# Patient Record
Sex: MALE | Race: WHITE | Employment: FULL TIME | URBAN - METROPOLITAN AREA
[De-identification: names, ages, dates, MRNs, and addresses within clinical notes are randomized per-mention and may not be internally consistent; named-entity substitution may affect disease eponyms.]

---

## 2017-01-17 ENCOUNTER — APPOINTMENT (OUTPATIENT)
Dept: GENERAL RADIOLOGY | Age: 27
End: 2017-01-17
Attending: PHYSICIAN ASSISTANT
Payer: COMMERCIAL

## 2017-01-17 ENCOUNTER — HOSPITAL ENCOUNTER (EMERGENCY)
Age: 27
Discharge: HOME | End: 2017-01-17
Attending: EMERGENCY MEDICINE
Payer: COMMERCIAL

## 2017-01-17 VITALS
RESPIRATION RATE: 17 BRPM | DIASTOLIC BLOOD PRESSURE: 60 MMHG | BODY MASS INDEX: 22.73 KG/M2 | TEMPERATURE: 99.6 F | SYSTOLIC BLOOD PRESSURE: 108 MMHG | HEIGHT: 68 IN | OXYGEN SATURATION: 99 % | WEIGHT: 150 LBS | HEART RATE: 73 BPM

## 2017-01-17 DIAGNOSIS — K52.9 GASTROENTERITIS: Primary | ICD-10-CM

## 2017-01-17 LAB
ALBUMIN SERPL BCP-MCNC: 4.3 G/DL (ref 3.5–5)
ALBUMIN/GLOB SERPL: 1.2 {RATIO} (ref 1.1–2.2)
ALP SERPL-CCNC: 75 U/L (ref 45–117)
ALT SERPL-CCNC: 27 U/L (ref 12–78)
ANION GAP BLD CALC-SCNC: 12 MMOL/L (ref 5–15)
AST SERPL W P-5'-P-CCNC: 16 U/L (ref 15–37)
BASOPHILS # BLD AUTO: 0 K/UL (ref 0–0.1)
BASOPHILS # BLD: 0 % (ref 0–1)
BILIRUB SERPL-MCNC: 0.4 MG/DL (ref 0.2–1)
BUN SERPL-MCNC: 16 MG/DL (ref 6–20)
BUN/CREAT SERPL: 14 (ref 12–20)
CALCIUM SERPL-MCNC: 9.2 MG/DL (ref 8.5–10.1)
CHLORIDE SERPL-SCNC: 99 MMOL/L (ref 97–108)
CO2 SERPL-SCNC: 26 MMOL/L (ref 21–32)
CREAT SERPL-MCNC: 1.17 MG/DL (ref 0.7–1.3)
DIFFERENTIAL METHOD BLD: ABNORMAL
EOSINOPHIL # BLD: 0 K/UL (ref 0–0.4)
EOSINOPHIL NFR BLD: 0 % (ref 0–7)
ERYTHROCYTE [DISTWIDTH] IN BLOOD BY AUTOMATED COUNT: 12.4 % (ref 11.5–14.5)
GLOBULIN SER CALC-MCNC: 3.7 G/DL (ref 2–4)
GLUCOSE SERPL-MCNC: 164 MG/DL (ref 65–100)
HCT VFR BLD AUTO: 40.8 % (ref 36.6–50.3)
HGB BLD-MCNC: 14.1 G/DL (ref 12.1–17)
LIPASE SERPL-CCNC: 84 U/L (ref 73–393)
LYMPHOCYTES # BLD AUTO: 5 % (ref 12–49)
LYMPHOCYTES # BLD: 0.7 K/UL (ref 0.8–3.5)
MCH RBC QN AUTO: 30.6 PG (ref 26–34)
MCHC RBC AUTO-ENTMCNC: 34.6 G/DL (ref 30–36.5)
MCV RBC AUTO: 88.5 FL (ref 80–99)
MONOCYTES # BLD: 0.4 K/UL (ref 0–1)
MONOCYTES NFR BLD AUTO: 3 % (ref 5–13)
NEUTS SEG # BLD: 12.3 K/UL (ref 1.8–8)
NEUTS SEG NFR BLD AUTO: 92 % (ref 32–75)
PLATELET # BLD AUTO: 321 K/UL (ref 150–400)
POTASSIUM SERPL-SCNC: 3.9 MMOL/L (ref 3.5–5.1)
PROT SERPL-MCNC: 8 G/DL (ref 6.4–8.2)
RBC # BLD AUTO: 4.61 M/UL (ref 4.1–5.7)
RBC MORPH BLD: ABNORMAL
SODIUM SERPL-SCNC: 137 MMOL/L (ref 136–145)
WBC # BLD AUTO: 13.4 K/UL (ref 4.1–11.1)

## 2017-01-17 PROCEDURE — 74011250636 HC RX REV CODE- 250/636: Performed by: PHYSICIAN ASSISTANT

## 2017-01-17 PROCEDURE — 80053 COMPREHEN METABOLIC PANEL: CPT | Performed by: PHYSICIAN ASSISTANT

## 2017-01-17 PROCEDURE — 96374 THER/PROPH/DIAG INJ IV PUSH: CPT

## 2017-01-17 PROCEDURE — 74020 XR ABD FLAT/ ERECT: CPT

## 2017-01-17 PROCEDURE — 96375 TX/PRO/DX INJ NEW DRUG ADDON: CPT

## 2017-01-17 PROCEDURE — 96361 HYDRATE IV INFUSION ADD-ON: CPT

## 2017-01-17 PROCEDURE — 99283 EMERGENCY DEPT VISIT LOW MDM: CPT

## 2017-01-17 PROCEDURE — 36415 COLL VENOUS BLD VENIPUNCTURE: CPT | Performed by: PHYSICIAN ASSISTANT

## 2017-01-17 PROCEDURE — 96372 THER/PROPH/DIAG INJ SC/IM: CPT

## 2017-01-17 PROCEDURE — 74011000250 HC RX REV CODE- 250: Performed by: PHYSICIAN ASSISTANT

## 2017-01-17 PROCEDURE — 83690 ASSAY OF LIPASE: CPT | Performed by: PHYSICIAN ASSISTANT

## 2017-01-17 PROCEDURE — 85025 COMPLETE CBC W/AUTO DIFF WBC: CPT | Performed by: PHYSICIAN ASSISTANT

## 2017-01-17 RX ORDER — ONDANSETRON 2 MG/ML
4 INJECTION INTRAMUSCULAR; INTRAVENOUS
Status: COMPLETED | OUTPATIENT
Start: 2017-01-17 | End: 2017-01-17

## 2017-01-17 RX ORDER — FAMOTIDINE 10 MG/ML
20 INJECTION INTRAVENOUS
Status: COMPLETED | OUTPATIENT
Start: 2017-01-17 | End: 2017-01-17

## 2017-01-17 RX ORDER — PROCHLORPERAZINE EDISYLATE 5 MG/ML
10 INJECTION INTRAMUSCULAR; INTRAVENOUS
Status: COMPLETED | OUTPATIENT
Start: 2017-01-17 | End: 2017-01-17

## 2017-01-17 RX ORDER — PROMETHAZINE HYDROCHLORIDE 25 MG/1
25 SUPPOSITORY RECTAL
Qty: 12 SUPPOSITORY | Refills: 0 | Status: SHIPPED | OUTPATIENT
Start: 2017-01-17 | End: 2017-01-24

## 2017-01-17 RX ORDER — ONDANSETRON 4 MG/1
4 TABLET, ORALLY DISINTEGRATING ORAL
Qty: 20 TAB | Refills: 0 | Status: ON HOLD | OUTPATIENT
Start: 2017-01-17 | End: 2017-09-02

## 2017-01-17 RX ORDER — DICYCLOMINE HYDROCHLORIDE 10 MG/ML
20 INJECTION INTRAMUSCULAR
Status: COMPLETED | OUTPATIENT
Start: 2017-01-17 | End: 2017-01-17

## 2017-01-17 RX ORDER — DICYCLOMINE HYDROCHLORIDE 20 MG/1
20 TABLET ORAL EVERY 6 HOURS
Qty: 20 TAB | Refills: 0 | Status: SHIPPED | OUTPATIENT
Start: 2017-01-17 | End: 2017-01-22

## 2017-01-17 RX ADMIN — FAMOTIDINE 20 MG: 10 INJECTION, SOLUTION INTRAVENOUS at 01:38

## 2017-01-17 RX ADMIN — PROCHLORPERAZINE EDISYLATE 10 MG: 5 INJECTION INTRAMUSCULAR; INTRAVENOUS at 01:35

## 2017-01-17 RX ADMIN — ONDANSETRON 4 MG: 2 INJECTION INTRAMUSCULAR; INTRAVENOUS at 00:49

## 2017-01-17 RX ADMIN — SODIUM CHLORIDE 1000 ML: 900 INJECTION, SOLUTION INTRAVENOUS at 00:43

## 2017-01-17 RX ADMIN — DICYCLOMINE HYDROCHLORIDE 20 MG: 20 INJECTION, SOLUTION INTRAMUSCULAR at 00:49

## 2017-01-17 NOTE — LETTER
[address] 
[facility] EMERGENCY DEPT 
[address] [facility] [place] [id number] [phone] Work/School Note Date: 1/17/2017 To Whom It May concern: 
 
Jorge Crowe was seen and treated today in the emergency room by the following provider(s): 
Physician Assistant: CARRILLO Rasmussen. Jorge Crowe may return to work on 1/19/2017. Sincerely, Heber Smith DO

## 2017-01-17 NOTE — ED PROVIDER NOTES
HPI Comments: Patient presents with complaints of nausea, vomiting and diarrhea x 9 hours. Denies fever or chills. Denies any sick contacts. Patient is a 27 y.o. male presenting with vomiting. The history is provided by the patient. Vomiting    This is a new problem. The current episode started 6 to 12 hours ago. The problem has not changed since onset. The emesis has an appearance of stomach contents. There has been no fever. Associated symptoms include abdominal pain and diarrhea. Pertinent negatives include no chills, no fever, no sweats, no headaches, no arthralgias, no myalgias, no cough, no URI and no headaches. The patient is not pregnant. His pertinent negatives include no irritable bowel syndrome, no inflammatory bowel disease, no short gut syndrome, no bowel resection, no recent abdominal surgery, no malabsorption, no gastric bypass and no DM. Past Medical History:   Diagnosis Date    Gastric ulcer      due to drug use    Heart murmur of      Hx of drug abuse     Ulcer ([facility] [address]        Past Surgical History:   Procedure Laterality Date    Hx other surgical       infant heart murmur         Family History:   Problem Relation Age of Onset    Migraines Mother        Social History     Social History    Marital status:      Spouse name: N/A    Number of children: N/A    Years of education: N/A     Occupational History    Not on file. Social History Main Topics    Smoking status: Former Smoker    Smokeless tobacco: Never Used    Alcohol use No    Drug use: No    Sexual activity: Yes     Partners: Female     Other Topics Concern    Not on file     Social History Narrative    ** Merged History Encounter **              ALLERGIES: Review of patient's allergies indicates no known allergies. Review of Systems   Constitutional: Negative. Negative for chills and fever. HENT: Negative. Eyes: Negative. Respiratory: Negative. Negative for cough.     Cardiovascular: Negative. Gastrointestinal: Positive for abdominal pain, diarrhea and vomiting. Endocrine: Negative. Genitourinary: Negative. Musculoskeletal: Negative. Negative for arthralgias and myalgias. Skin: Negative. Allergic/Immunologic: Negative. Neurological: Negative. Negative for headaches. Hematological: Negative. Psychiatric/Behavioral: Negative. All other systems reviewed and are negative. Vitals:    01/17/17 0013   BP: 121/72   Pulse: 73   Resp: 17   Temp: 99.6 °F (37.6 °C)   SpO2: 96%   Weight: 68 kg (150 lb)   Height: 5' 8\" (1.727 m)            Physical Exam   Constitutional: He is oriented to person, place, and time. He appears well-developed and well-nourished. Non-toxic appearance. He does not have a sickly appearance. He appears ill. No distress. Patient vomiting during exam.   HENT:   Head: Normocephalic and atraumatic. Right Ear: External ear normal.   Left Ear: External ear normal.   Mouth/Throat: Oropharynx is clear and moist. No oropharyngeal exudate. Eyes: Conjunctivae and EOM are normal. Pupils are equal, round, and reactive to light. Right eye exhibits no discharge. Left eye exhibits no discharge. No scleral icterus. Neck: Normal range of motion. Neck supple. No tracheal deviation present. No thyromegaly present. Cardiovascular: Normal rate, regular rhythm, normal heart sounds and intact distal pulses. No murmur heard. Pulmonary/Chest: Effort normal and breath sounds normal. No respiratory distress. He has no wheezes. He has no rales. Abdominal: Soft. Normal appearance and bowel sounds are normal. He exhibits no distension. There is no tenderness. There is no rebound and no guarding. Musculoskeletal: Normal range of motion. He exhibits no edema or tenderness. Lymphadenopathy:     He has no cervical adenopathy. Neurological: He is alert and oriented to person, place, and time. No cranial nerve deficit. Coordination normal.   Skin: Skin is warm.  No rash noted. No erythema. Psychiatric: He has a normal mood and affect. His behavior is normal. Judgment and thought content normal.   Nursing note and vitals reviewed. MDM  Number of Diagnoses or Management Options  Gastroenteritis:   Diagnosis management comments: Assesment/Plan- mild leukocytosis in ED. Glucose elevated but no history of diabetes and no anion gap. Negative lipase. Symptoms likely related to gastroenteritis. Discharge with rx for symptomatic treatment. Patient encouraged to follow up with PCP and patient is educated on reasons to return to the ED.          Amount and/or Complexity of Data Reviewed  Clinical lab tests: ordered and reviewed  Tests in the radiology section of CPT®: ordered and reviewed  Tests in the medicine section of CPT®: reviewed and ordered  Discuss the patient with other providers: yes (Attending- Dr. Starr Peña)      ED Course       Procedures

## 2017-01-17 NOTE — DISCHARGE INSTRUCTIONS
Gastroenteritis: Care Instructions  Your Care Instructions  Gastroenteritis is an illness that may cause nausea, vomiting, and diarrhea. It is sometimes called \"stomach flu. \" It can be caused by bacteria or a virus. You will probably begin to feel better in 1 to 2 days. In the meantime, get plenty of rest and make sure you do not become dehydrated. Dehydration occurs when your body loses too much fluid. Follow-up care is a key part of your treatment and safety. Be sure to make and go to all appointments, and call your doctor if you are having problems. Its also a good idea to know your test results and keep a list of the medicines you take. How can you care for yourself at home? · If your doctor prescribed antibiotics, take them as directed. Do not stop taking them just because you feel better. You need to take the full course of antibiotics. · Drink plenty of fluids to prevent dehydration, enough so that your urine is light yellow or clear like water. Choose water and other caffeine-free clear liquids until you feel better. If you have kidney, heart, or liver disease and have to limit fluids, talk with your doctor before you increase your fluid intake. · Drink fluids slowly, in frequent, small amounts, because drinking too much too fast can cause vomiting. · Begin eating mild foods, such as dry toast, yogurt, applesauce, bananas, and rice. Avoid spicy, hot, or high-fat foods, and do not drink alcohol or caffeine for a day or two. Do not drink milk or eat ice cream until you are feeling better. How to prevent gastroenteritis  · Keep hot foods hot and cold foods cold. · Do not eat meats, dressings, salads, or other foods that have been kept at room temperature for more than 2 hours. · Use a thermometer to check your refrigerator. It should be between 34°F and 40°F.  · Defrost meats in the refrigerator or microwave, not on the kitchen counter. · Keep your hands and your kitchen clean.  Wash your hands, cutting boards, and countertops with hot soapy water frequently. · Cook meat until it is well done. · Do not eat raw eggs or uncooked sauces made with raw eggs. · Do not take chances. If food looks or tastes spoiled, throw it out. When should you call for help? Call 911 anytime you think you may need emergency care. For example, call if:  · You vomit blood or what looks like coffee grounds. · You passed out (lost consciousness). · You pass maroon or very bloody stools. Call your doctor now or seek immediate medical care if:  · You have severe belly pain. · You have signs of needing more fluids. You have sunken eyes, a dry mouth, and pass only a little dark urine. · You feel like you are going to faint. · You have increased belly pain that does not go away in 1 to 2 days. · You have new or increased nausea, or you are vomiting. · You have a new or higher fever. · Your stools are black and tarlike or have streaks of blood. Watch closely for changes in your health, and be sure to contact your doctor if:  · You are dizzy or lightheaded. · You urinate less than usual, or your urine is dark yellow or brown. · You do not feel better with each day that goes by. Where can you learn more? Go to http://isaac-zoraida.info/. Enter N142 in the search box to learn more about \"Gastroenteritis: Care Instructions. \"  Current as of: May 24, 2016  Content Version: 11.1  © 5819-8028 [employer]. Care instructions adapted under license by [employer] (which disclaims liability or warranty for this information). If you have questions about a medical condition or this instruction, always ask your healthcare professional. [address] any warranty or liability for your use of this information. We hope that we have addressed all of your medical concerns.  The examination and treatment you received in the Emergency Department were for an emergent problem and were not intended as complete care. It is important that you follow up with your healthcare provider(s) for ongoing care. If your symptoms worsen or do not improve as expected, and you are unable to reach your usual health care provider(s), you should return to the Emergency Department. Today's healthcare is undergoing tremendous change, and patient satisfaction surveys are one of the many tools to assess the quality of medical care. You may receive a survey from the [employer] regarding your experience in the Emergency Department. I hope that your experience has been completely positive, particularly the medical care that I provided. As such, please participate in the survey; anything less than excellent does not meet my expectations or intentions. [address] and [facility] participate in nationally recognized quality of care measures. If your blood pressure is greater than 120/80, as reported below, we urge that you seek medical care to address the potential of high blood pressure, commonly known as hypertension. Hypertension can be hereditary or can be caused by certain medical conditions, pain, stress, or \"white coat syndrome. \"       Please make an appointment with your health care provider(s) for follow up of your Emergency Department visit. VITALS:   Patient Vitals for the past 8 hrs:   Temp Pulse Resp BP SpO2   01/17/17 0013 99.6 °F (37.6 °C) 73 17 121/72 96 %          Thank you for allowing us to provide you with medical care today. We realize that you have many choices for your emergency care needs. Please choose us in the future for any continued health care needs. Kassi Salazar[address].   Office: [phone]            Recent Results (from the past 24 hour(s))   CBC WITH AUTOMATED DIFF    Collection Time: 01/17/17 12:43 AM   Result Value Ref Range    WBC 13.4 (H) 4.1 - 11.1 K/uL    RBC 4.61 4.10 - 5.70 M/uL    HGB 14.1 12.1 - 17.0 g/dL    HCT 40.8 36.6 - 50.3 %    MCV 88.5 80.0 - 99.0 FL    MCH 30.6 26.0 - 34.0 PG    MCHC 34.6 30.0 - 36.5 g/dL    RDW 12.4 11.5 - 14.5 %    PLATELET 692 502 - 913 K/uL    NEUTROPHILS 92 (H) 32 - 75 %    LYMPHOCYTES 5 (L) 12 - 49 %    MONOCYTES 3 (L) 5 - 13 %    EOSINOPHILS 0 0 - 7 %    BASOPHILS 0 0 - 1 %    ABS. NEUTROPHILS 12.3 (H) 1.8 - 8.0 K/UL    ABS. LYMPHOCYTES 0.7 (L) 0.8 - 3.5 K/UL    ABS. MONOCYTES 0.4 0.0 - 1.0 K/UL    ABS. EOSINOPHILS 0.0 0.0 - 0.4 K/UL    ABS. BASOPHILS 0.0 0.0 - 0.1 K/UL    DF SMEAR SCANNED      RBC COMMENTS NORMOCYTIC, NORMOCHROMIC     METABOLIC PANEL, COMPREHENSIVE    Collection Time: 01/17/17 12:43 AM   Result Value Ref Range    Sodium 137 136 - 145 mmol/L    Potassium 3.9 3.5 - 5.1 mmol/L    Chloride 99 97 - 108 mmol/L    CO2 26 21 - 32 mmol/L    Anion gap 12 5 - 15 mmol/L    Glucose 164 (H) 65 - 100 mg/dL    BUN 16 6 - 20 MG/DL    Creatinine 1.17 0.70 - 1.30 MG/DL    BUN/Creatinine ratio 14 12 - 20      GFR est AA >60 >60 ml/min/1.73m2    GFR est non-AA >60 >60 ml/min/1.73m2    Calcium 9.2 8.5 - 10.1 MG/DL    Bilirubin, total 0.4 0.2 - 1.0 MG/DL    ALT 27 12 - 78 U/L    AST 16 15 - 37 U/L    Alk. phosphatase 75 45 - 117 U/L    Protein, total 8.0 6.4 - 8.2 g/dL    Albumin 4.3 3.5 - 5.0 g/dL    Globulin 3.7 2.0 - 4.0 g/dL    A-G Ratio 1.2 1.1 - 2.2     LIPASE    Collection Time: 01/17/17 12:43 AM   Result Value Ref Range    Lipase 84 73 - 393 U/L       Xr Abd Flat/ Erect    Result Date: 1/17/2017  INDICATION:   abdominal pain COMPARISON: December 17, 2008 FINDINGS: Supine and upright views of the abdomen demonstrate a nonobstructive bowel gas pattern. There is no free air. No abnormal calcifications are identified. The osseous structures are normal.     IMPRESSION: No acute process.

## 2017-01-18 ENCOUNTER — APPOINTMENT (OUTPATIENT)
Dept: CT IMAGING | Age: 27
End: 2017-01-18
Attending: EMERGENCY MEDICINE
Payer: COMMERCIAL

## 2017-01-18 ENCOUNTER — HOSPITAL ENCOUNTER (EMERGENCY)
Age: 27
Discharge: HOME | End: 2017-01-18
Attending: EMERGENCY MEDICINE | Admitting: FAMILY MEDICINE
Payer: COMMERCIAL

## 2017-01-18 VITALS
TEMPERATURE: 97.9 F | OXYGEN SATURATION: 98 % | DIASTOLIC BLOOD PRESSURE: 74 MMHG | SYSTOLIC BLOOD PRESSURE: 115 MMHG | RESPIRATION RATE: 18 BRPM | HEART RATE: 74 BPM

## 2017-01-18 DIAGNOSIS — R11.2 INTRACTABLE VOMITING WITH NAUSEA, UNSPECIFIED VOMITING TYPE: ICD-10-CM

## 2017-01-18 DIAGNOSIS — K52.9 COLITIS: Primary | ICD-10-CM

## 2017-01-18 LAB
ALBUMIN SERPL BCP-MCNC: 4.2 G/DL (ref 3.5–5)
ALBUMIN SERPL BCP-MCNC: 4.2 G/DL (ref 3.5–5)
ALBUMIN SERPL BCP-MCNC: ABNORMAL G/DL
ALBUMIN/GLOB SERPL: 1.2 {RATIO} (ref 1.1–2.2)
ALBUMIN/GLOB SERPL: 1.2 {RATIO} (ref 1.1–2.2)
ALBUMIN/GLOB SERPL: ABNORMAL {RATIO}
ALP SERPL-CCNC: 77 U/L (ref 45–117)
ALP SERPL-CCNC: 77 U/L (ref 45–117)
ALP SERPL-CCNC: ABNORMAL U/L
ALT SERPL-CCNC: 25 U/L (ref 12–78)
ALT SERPL-CCNC: 25 U/L (ref 12–78)
ALT SERPL-CCNC: ABNORMAL U/L
AMPHET UR QL SCN: NEGATIVE
ANION GAP BLD CALC-SCNC: 12 MMOL/L (ref 5–15)
APPEARANCE UR: CLEAR
AST SERPL W P-5'-P-CCNC: 25 U/L (ref 15–37)
AST SERPL W P-5'-P-CCNC: 25 U/L (ref 15–37)
AST SERPL W P-5'-P-CCNC: ABNORMAL U/L
BACTERIA URNS QL MICRO: NEGATIVE /HPF
BARBITURATES UR QL SCN: NEGATIVE
BASOPHILS # BLD AUTO: 0 K/UL (ref 0–0.1)
BASOPHILS # BLD AUTO: 0 K/UL (ref 0–0.1)
BASOPHILS # BLD: 0 % (ref 0–1)
BASOPHILS # BLD: 0 % (ref 0–1)
BENZODIAZ UR QL: NEGATIVE
BILIRUB SERPL-MCNC: 0.5 MG/DL (ref 0.2–1)
BILIRUB SERPL-MCNC: 0.5 MG/DL (ref 0.2–1)
BILIRUB SERPL-MCNC: ABNORMAL MG/DL
BILIRUB UR QL: NEGATIVE
BUN SERPL-MCNC: 20 MG/DL (ref 6–20)
BUN/CREAT SERPL: 17 (ref 12–20)
CALCIUM SERPL-MCNC: 9.6 MG/DL (ref 8.5–10.1)
CANNABINOIDS UR QL SCN: POSITIVE
CHLORIDE SERPL-SCNC: 101 MMOL/L (ref 97–108)
CO2 SERPL-SCNC: 28 MMOL/L (ref 21–32)
COCAINE UR QL SCN: NEGATIVE
COLOR UR: ABNORMAL
CREAT SERPL-MCNC: 1.16 MG/DL (ref 0.7–1.3)
DRUG SCRN COMMENT,DRGCM: ABNORMAL
EOSINOPHIL # BLD: 0 K/UL (ref 0–0.4)
EOSINOPHIL # BLD: 0 K/UL (ref 0–0.4)
EOSINOPHIL NFR BLD: 0 % (ref 0–7)
EOSINOPHIL NFR BLD: 0 % (ref 0–7)
EPITH CASTS URNS QL MICRO: ABNORMAL /LPF
ERYTHROCYTE [DISTWIDTH] IN BLOOD BY AUTOMATED COUNT: 12.4 % (ref 11.5–14.5)
ERYTHROCYTE [DISTWIDTH] IN BLOOD BY AUTOMATED COUNT: 12.4 % (ref 11.5–14.5)
GLOBULIN SER CALC-MCNC: 3.6 G/DL (ref 2–4)
GLOBULIN SER CALC-MCNC: 3.6 G/DL (ref 2–4)
GLOBULIN SER CALC-MCNC: ABNORMAL G/DL
GLUCOSE SERPL-MCNC: 126 MG/DL (ref 65–100)
GLUCOSE UR STRIP.AUTO-MCNC: NEGATIVE MG/DL
HCT VFR BLD AUTO: 42.1 % (ref 36.6–50.3)
HCT VFR BLD AUTO: 42.1 % (ref 36.6–50.3)
HGB BLD-MCNC: 15 G/DL (ref 12.1–17)
HGB BLD-MCNC: 15 G/DL (ref 12.1–17)
HGB UR QL STRIP: NEGATIVE
HYALINE CASTS URNS QL MICRO: ABNORMAL /LPF (ref 0–5)
KETONES UR QL STRIP.AUTO: 40 MG/DL
LEUKOCYTE ESTERASE UR QL STRIP.AUTO: NEGATIVE
LIPASE SERPL-CCNC: 110 U/L (ref 73–393)
LIPASE SERPL-CCNC: 110 U/L (ref 73–393)
LYMPHOCYTES # BLD AUTO: 10 % (ref 12–49)
LYMPHOCYTES # BLD AUTO: 10 % (ref 12–49)
LYMPHOCYTES # BLD: 1.1 K/UL (ref 0.8–3.5)
LYMPHOCYTES # BLD: 1.1 K/UL (ref 0.8–3.5)
MAGNESIUM SERPL-MCNC: 1.7 MG/DL (ref 1.6–2.4)
MCH RBC QN AUTO: 31.1 PG (ref 26–34)
MCH RBC QN AUTO: 31.1 PG (ref 26–34)
MCHC RBC AUTO-ENTMCNC: 35.6 G/DL (ref 30–36.5)
MCHC RBC AUTO-ENTMCNC: 35.6 G/DL (ref 30–36.5)
MCV RBC AUTO: 87.3 FL (ref 80–99)
MCV RBC AUTO: 87.3 FL (ref 80–99)
METHADONE UR QL: NEGATIVE
MONOCYTES # BLD: 0.7 K/UL (ref 0–1)
MONOCYTES # BLD: 0.7 K/UL (ref 0–1)
MONOCYTES NFR BLD AUTO: 6 % (ref 5–13)
MONOCYTES NFR BLD AUTO: 6 % (ref 5–13)
NEUTS SEG # BLD: 9.3 K/UL (ref 1.8–8)
NEUTS SEG # BLD: 9.3 K/UL (ref 1.8–8)
NEUTS SEG NFR BLD AUTO: 84 % (ref 32–75)
NEUTS SEG NFR BLD AUTO: 84 % (ref 32–75)
NITRITE UR QL STRIP.AUTO: NEGATIVE
OPIATES UR QL: POSITIVE
PCP UR QL: NEGATIVE
PH UR STRIP: 6 [PH] (ref 5–8)
PLATELET # BLD AUTO: 356 K/UL (ref 150–400)
PLATELET # BLD AUTO: 356 K/UL (ref 150–400)
POTASSIUM SERPL-SCNC: 3.4 MMOL/L (ref 3.5–5.1)
PROT SERPL-MCNC: 7.8 G/DL (ref 6.4–8.2)
PROT SERPL-MCNC: 7.8 G/DL (ref 6.4–8.2)
PROT SERPL-MCNC: ABNORMAL G/DL
PROT UR STRIP-MCNC: NEGATIVE MG/DL
RBC # BLD AUTO: 4.82 M/UL (ref 4.1–5.7)
RBC # BLD AUTO: 4.82 M/UL (ref 4.1–5.7)
RBC #/AREA URNS HPF: ABNORMAL /HPF (ref 0–5)
SODIUM SERPL-SCNC: 141 MMOL/L (ref 136–145)
SP GR UR REFRACTOMETRY: 1.01 (ref 1–1.03)
UA: UC IF INDICATED,UAUC: ABNORMAL
UROBILINOGEN UR QL STRIP.AUTO: 0.2 EU/DL (ref 0.2–1)
WBC # BLD AUTO: 11.1 K/UL (ref 4.1–11.1)
WBC # BLD AUTO: 11.1 K/UL (ref 4.1–11.1)
WBC URNS QL MICRO: ABNORMAL /HPF (ref 0–4)

## 2017-01-18 PROCEDURE — 36415 COLL VENOUS BLD VENIPUNCTURE: CPT

## 2017-01-18 PROCEDURE — 74011000250 HC RX REV CODE- 250: Performed by: EMERGENCY MEDICINE

## 2017-01-18 PROCEDURE — 74011000258 HC RX REV CODE- 258: Performed by: EMERGENCY MEDICINE

## 2017-01-18 PROCEDURE — 74011250636 HC RX REV CODE- 250/636: Performed by: FAMILY MEDICINE

## 2017-01-18 PROCEDURE — 74011636320 HC RX REV CODE- 636/320: Performed by: RADIOLOGY

## 2017-01-18 PROCEDURE — 96375 TX/PRO/DX INJ NEW DRUG ADDON: CPT

## 2017-01-18 PROCEDURE — 74011250636 HC RX REV CODE- 250/636: Performed by: EMERGENCY MEDICINE

## 2017-01-18 PROCEDURE — 85025 COMPLETE CBC W/AUTO DIFF WBC: CPT

## 2017-01-18 PROCEDURE — 96367 TX/PROPH/DG ADDL SEQ IV INF: CPT

## 2017-01-18 PROCEDURE — 81001 URINALYSIS AUTO W/SCOPE: CPT | Performed by: EMERGENCY MEDICINE

## 2017-01-18 PROCEDURE — C9113 INJ PANTOPRAZOLE SODIUM, VIA: HCPCS | Performed by: FAMILY MEDICINE

## 2017-01-18 PROCEDURE — 96365 THER/PROPH/DIAG IV INF INIT: CPT

## 2017-01-18 PROCEDURE — 80307 DRUG TEST PRSMV CHEM ANLYZR: CPT | Performed by: FAMILY MEDICINE

## 2017-01-18 PROCEDURE — 74011000250 HC RX REV CODE- 250: Performed by: FAMILY MEDICINE

## 2017-01-18 PROCEDURE — 74177 CT ABD & PELVIS W/CONTRAST: CPT

## 2017-01-18 PROCEDURE — 83690 ASSAY OF LIPASE: CPT

## 2017-01-18 PROCEDURE — 99285 EMERGENCY DEPT VISIT HI MDM: CPT

## 2017-01-18 PROCEDURE — 80053 COMPREHEN METABOLIC PANEL: CPT

## 2017-01-18 PROCEDURE — 96368 THER/DIAG CONCURRENT INF: CPT

## 2017-01-18 PROCEDURE — 74011250636 HC RX REV CODE- 250/636

## 2017-01-18 PROCEDURE — 96361 HYDRATE IV INFUSION ADD-ON: CPT

## 2017-01-18 PROCEDURE — 83735 ASSAY OF MAGNESIUM: CPT | Performed by: FAMILY MEDICINE

## 2017-01-18 RX ORDER — PROMETHAZINE HYDROCHLORIDE 25 MG/1
25 TABLET ORAL
COMMUNITY
End: 2017-09-01

## 2017-01-18 RX ORDER — CIPROFLOXACIN 2 MG/ML
400 INJECTION, SOLUTION INTRAVENOUS EVERY 12 HOURS
Status: DISCONTINUED | OUTPATIENT
Start: 2017-01-18 | End: 2017-01-18 | Stop reason: CLARIF

## 2017-01-18 RX ORDER — ACETAMINOPHEN 325 MG/1
650 TABLET ORAL
Status: DISCONTINUED | OUTPATIENT
Start: 2017-01-18 | End: 2017-01-18 | Stop reason: HOSPADM

## 2017-01-18 RX ORDER — PROCHLORPERAZINE EDISYLATE 5 MG/ML
INJECTION INTRAMUSCULAR; INTRAVENOUS
Status: DISPENSED
Start: 2017-01-18 | End: 2017-01-18

## 2017-01-18 RX ORDER — HYDROMORPHONE HYDROCHLORIDE 1 MG/ML
1 INJECTION, SOLUTION INTRAMUSCULAR; INTRAVENOUS; SUBCUTANEOUS
Status: COMPLETED | OUTPATIENT
Start: 2017-01-18 | End: 2017-01-18

## 2017-01-18 RX ORDER — MORPHINE SULFATE 4 MG/ML
INJECTION, SOLUTION INTRAMUSCULAR; INTRAVENOUS
Status: DISPENSED
Start: 2017-01-18 | End: 2017-01-18

## 2017-01-18 RX ORDER — ONDANSETRON 2 MG/ML
INJECTION INTRAMUSCULAR; INTRAVENOUS
Status: DISPENSED
Start: 2017-01-18 | End: 2017-01-18

## 2017-01-18 RX ORDER — METRONIDAZOLE 500 MG/100ML
500 INJECTION, SOLUTION INTRAVENOUS EVERY 8 HOURS
Status: DISCONTINUED | OUTPATIENT
Start: 2017-01-18 | End: 2017-01-18 | Stop reason: HOSPADM

## 2017-01-18 RX ORDER — FAMOTIDINE 10 MG/ML
INJECTION INTRAVENOUS
Status: DISPENSED
Start: 2017-01-18 | End: 2017-01-18

## 2017-01-18 RX ORDER — SODIUM CHLORIDE 9 MG/ML
100 INJECTION, SOLUTION INTRAVENOUS CONTINUOUS
Status: DISCONTINUED | OUTPATIENT
Start: 2017-01-18 | End: 2017-01-18 | Stop reason: HOSPADM

## 2017-01-18 RX ORDER — POTASSIUM CHLORIDE 7.45 MG/ML
10 INJECTION INTRAVENOUS
Status: DISCONTINUED | OUTPATIENT
Start: 2017-01-18 | End: 2017-01-18

## 2017-01-18 RX ORDER — MAG HYDROX/ALUMINUM HYD/SIMETH 200-200-20
SUSPENSION, ORAL (FINAL DOSE FORM) ORAL
Status: DISPENSED
Start: 2017-01-18 | End: 2017-01-18

## 2017-01-18 RX ORDER — SODIUM CHLORIDE 0.9 % (FLUSH) 0.9 %
5-10 SYRINGE (ML) INJECTION EVERY 8 HOURS
Status: DISCONTINUED | OUTPATIENT
Start: 2017-01-18 | End: 2017-01-18 | Stop reason: HOSPADM

## 2017-01-18 RX ORDER — LEVOFLOXACIN 5 MG/ML
750 INJECTION, SOLUTION INTRAVENOUS EVERY 24 HOURS
Status: DISCONTINUED | OUTPATIENT
Start: 2017-01-18 | End: 2017-01-18 | Stop reason: HOSPADM

## 2017-01-18 RX ORDER — SODIUM CHLORIDE 0.9 % (FLUSH) 0.9 %
5-10 SYRINGE (ML) INJECTION AS NEEDED
Status: DISCONTINUED | OUTPATIENT
Start: 2017-01-18 | End: 2017-01-18 | Stop reason: HOSPADM

## 2017-01-18 RX ORDER — METRONIDAZOLE 500 MG/100ML
500 INJECTION, SOLUTION INTRAVENOUS
Status: COMPLETED | OUTPATIENT
Start: 2017-01-18 | End: 2017-01-18

## 2017-01-18 RX ORDER — PROCHLORPERAZINE EDISYLATE 5 MG/ML
10 INJECTION INTRAMUSCULAR; INTRAVENOUS
Status: DISCONTINUED | OUTPATIENT
Start: 2017-01-18 | End: 2017-01-18 | Stop reason: HOSPADM

## 2017-01-18 RX ADMIN — METRONIDAZOLE 500 MG: 500 INJECTION, SOLUTION INTRAVENOUS at 15:51

## 2017-01-18 RX ADMIN — METRONIDAZOLE 500 MG: 500 INJECTION, SOLUTION INTRAVENOUS at 05:51

## 2017-01-18 RX ADMIN — Medication 10 ML: at 15:53

## 2017-01-18 RX ADMIN — HYDROMORPHONE HYDROCHLORIDE 1 MG: 1 INJECTION, SOLUTION INTRAMUSCULAR; INTRAVENOUS; SUBCUTANEOUS at 05:17

## 2017-01-18 RX ADMIN — Medication 10 ML: at 09:14

## 2017-01-18 RX ADMIN — SODIUM CHLORIDE 40 MG: 9 INJECTION INTRAMUSCULAR; INTRAVENOUS; SUBCUTANEOUS at 09:08

## 2017-01-18 RX ADMIN — LEVOFLOXACIN 750 MG: 5 INJECTION, SOLUTION INTRAVENOUS at 09:11

## 2017-01-18 RX ADMIN — CEFTRIAXONE 1 G: 1 INJECTION, POWDER, FOR SOLUTION INTRAMUSCULAR; INTRAVENOUS at 05:16

## 2017-01-18 RX ADMIN — IOPAMIDOL 94 ML: 755 INJECTION, SOLUTION INTRAVENOUS at 04:30

## 2017-01-18 RX ADMIN — SODIUM CHLORIDE 100 ML/HR: 900 INJECTION, SOLUTION INTRAVENOUS at 09:07

## 2017-01-18 NOTE — PROGRESS NOTES
[address] [facility]   Senior Resident Admission Note    CC:  Vomiting, abdominal pain    HPI:  Sukumar Alberts is a 27 y.o. male with PMH of PUD and current marihuana use who presents with sharp epigastric pain and persistent vomiting. + watery diarrhea, + flatus. Pt was see in the ED yesterday for same and discharged home with phenergan, zofran, bentyl and told to return is sxs worsened. PUD dx'd 10 yrs ago. Denies bloody or coffee ground emesis. No blood in diarrhea. No CP, SOB. Denies tobacco or etoh use. + marijuana use, has cut back from 10 yrs ago bc told that likely caused his ulcer. Denies frequent GI issues. Son with emesis as well. Chart reviewed. Patient seen, examined, and discussed with Dr. Tavares Dobson (PGY-1). See her note for more details. PE:  Visit Vitals    /73    Pulse 61    Temp 97.9 °F (36.6 °C)    Resp 16    SpO2 98%      Gen - appears uncomfortable, but in no acute distress  CV - RR, nl HR, no m/r/g  Lungs - CTAB  Abd - + BS, soft, tender to moderate palpation of the epigastric region, no LLQ or RLQ ttp, neg alejandre's sign and no ttp at [facility]burney's pt, no guarding or rebound, no HSM  Extrem - no edema, ttp        A/P:   1. Epigastric pain and vomiting:  Colitis reported on prelim CT abd/pelvis (I spoke directly with radiologist, Dr. Belma Goltz). No obstruction, unremarkable stomach and small bowel. Area of inflammation not c/w pt's complaint and exam findings. CBC, CMP, lipase and UA essentially wnl. Hgb stable from yesterday and wnl. Afebrile at home and here. - Admit to med/surg for observation   - Will obtain lactic acid, UDS, FOBT.     - Obtain baseline EKG to establish QTc given tx with fluoroquinolone and zofran   - Start protonix 40mg IV daily now   - Treat as colitis/gastroenteritis with MIVF, cipro and flagyl, pain and nausea control   - Limit opiates   - NPO for now and advance diet as tolerated   - Consider Gi consult if concern increases for recurrent PUD/GIB or gastricemptying issue. Can alternatively plan for outpatient GI follow up   - SCDs only for now given PUD hx and likely plan for d/c home soon  2. Mild hypokalemia:   Likely 2/2 Gi losses. Checking Mag. Repleting with IV potassium for now given recent inability to down PO      I agree with remaining assessment and plan as documented in Dr. Edith Balbuena note. Case discussed with on call FM attending, Dr. Tona Larson.     Kirby Reyna MD  [address] [facility] Residency  Pager [phone]

## 2017-01-18 NOTE — DISCHARGE INSTRUCTIONS
HOME DISCHARGE INSTRUCTIONS    Dayron Acevedo / [id number] : 1990    Admission date: 2017 Discharge date: 2017     Please bring this form with you to show your care provider at your follow-up appointment. Primary care provider:  Sera Lyon MD    Discharging provider:  Fadia Beatty MD  - Family Medicine Resident  Dr. Matty Castaneda MD - Attending, Family Medicine     You have been admitted to the hospital with the following diagnoses:    ACUTE DIAGNOSES:  · Colitis  . . . . . . . . . . . . . . . . . . . . . . . . . . . . . . . . . . . . . . . . . . . . . . . . . . . . . . . . . . . . . . . . . . . . . . . Vidal Fernando FOLLOW-UP CARE RECOMMENDATIONS:  - Please SLOWLY advance your diet. It is very important that you stick to liquids such as broths and soups for tonight. If you tolerate that well, you can try to slowly add things such as crackers, and bread tomorrow. - Advancing your diet too quickly may make you feel ill again  - You previously received some medications to help with nausea (zofran and phenergan); Take these as needed. If symptoms return, call your primary care doctor. Follow-up tests needed: none    Pending test results: At the time of your discharge the following test results are still pending: none. Please make sure you review these results with your outpatient follow-up provider(s). Specific symptoms to watch for: chest pain, shortness of breath, fever (100.4 Farenheit or greater), chills, nausea, vomiting, diarrhea, change in mentation, falling, weakness, bleeding. DIET/what to eat:  Clear liquids, advance as tolerated    ACTIVITY:  Activity as tolerated    What to do if new or unexpected symptoms occur? If you experience any of the above symptoms (or should other concerns or questions arise after discharge) please call your primary care physician. Return to the emergency room if you cannot get hold of your doctor.     · It is very important that you keep your follow-up appointment(s). · Please bring discharge papers, medication list (and/or medication bottles) to your follow-up appointments for review by your outpatient provider(s). · Please check the list of medications and be sure it includes every medication (even non-prescription medications) that your provider wants you to take. · It is important that you take the medication exactly as they are prescribed. · Keep your medication in the bottles provided by the pharmacist and keep a list of the medication names, dosages, and times to be taken in your wallet. · Do not take other medications without consulting your doctor. · If you have any questions about your medications or other instructions, please talk to your nurse or care provider before you leave the hospital.     Information obtained by:     I understand that if any problems occur once I am at home I am to contact my physician. These instructions were explained to me and I had the opportunity to ask questions. I understand and acknowledge receipt of the instructions indicated above.                                                                                                                                                Physician's or R.N.'s Signature                                                                  Date/Time                                                                                                                                              Patient or Representative Signature                                                          Date/Time    Follow-up Information     Follow up With Details Comments Contact Info    Semaj Schmitz MD Schedule an appointment as soon as possible for a visit in 1 week  [address]  [address] [facility] [place] [address]

## 2017-01-18 NOTE — ED NOTES
Pt arrived tonight with complains of N/V with a little diarrhea that started Monday. Pt has pain in his mid upper epi gastric that is tender to palpation.   Pt has multiple episodes of vomiting tonight while in the ER

## 2017-01-18 NOTE — ED NOTES
Pt is now sitting up in the bed. Pt reports that the pain is now better. Pt asked for his phone so he could call his wife to give her updates.

## 2017-01-18 NOTE — ED PROVIDER NOTES
HPI Comments: 27 y.o. male with past medical history significant for gastric ulcer who presents ambulatory from home with chief complaint of vomiting. Pt started with epigastric discomfort and vomiting at work 2 days ago. He was seen in the ED yesterday for his symptoms and had a negative x-ray and unremarkable labs. He was discharged home on phenergan and bentyl, which he states he's taken with worsening symptoms. He now describes moderate to severe sharp constant epigastric pain that is worse when vomiting along with watery, but minimal stools. He states he feels warm, but denies fever. He reports hx of similar symptoms about 10 years ago when he was diagnosed with an ulcer. He admits his son started vomiting today. He denies hematemesis and blood in stool. He also denies hx of abdominal surgery. There are no other acute medical concerns at this time. Old chart review: Pt was seen in the ED yesterday for similar symptoms. He had an abdominal x-ray that was negative. WBC 13 and . CMP and lipase were otherwise normal. He was discharged home with phenergan and Bentyl. Social hx: Former smoker; no EtOH use; no illicit drug use. PCP: Chelsie Parra MD    Note written by Lima Noriega, as dictated by Hallie Bob DO 1: 62 AM      The history is provided by the patient. Past Medical History:   Diagnosis Date    Gastric ulcer      due to drug use    Heart murmur of      Hx of drug abuse     Ulcer ([facility] [address]        Past Surgical History:   Procedure Laterality Date    Hx other surgical       infant heart murmur         Family History:   Problem Relation Age of Onset    Migraines Mother        Social History     Social History    Marital status:      Spouse name: N/A    Number of children: N/A    Years of education: N/A     Occupational History    Not on file.      Social History Main Topics    Smoking status: Former Smoker    Smokeless tobacco: Never Used    Alcohol use No    Drug use: No    Sexual activity: Yes     Partners: Female     Other Topics Concern    Not on file     Social History Narrative    ** Merged History Encounter **          ALLERGIES: Review of patient's allergies indicates no known allergies. Review of Systems   Constitutional: Negative for fever. Gastrointestinal: Positive for abdominal pain, diarrhea (described as loose stools), nausea and vomiting. Negative for blood in stool. All other systems reviewed and are negative. Vitals:    01/18/17 0530   BP: 129/69   SpO2: 95%            Physical Exam      Constitutional: Pt is awake and alert. Pt appears well-developed and well-nourished. NAD. HENT:   Head: Normocephalic and atraumatic. Nose: Nose normal.   Mouth/Throat: Oropharynx is clear and moist. No oropharyngeal exudate. Eyes: Conjunctivae and extraocular motions are normal. Pupils are equal, round, and reactive to light. Right eye exhibits no discharge. Left eye exhibits no discharge. No scleral icterus. Neck: No tracheal deviation present. Supple neck. Cardiovascular: Normal rate, regular rhythm, normal heart sounds and intact distal pulses. Exam reveals no gallop and no friction rub. No murmur heard. Pulmonary/Chest: Effort normal and breath sounds normal.  Pt  has no wheezes. Pt  has no rales. Abdominal: Soft. Pt  exhibits no distension and no mass. Mild epigastric tenderness. Pt  has no rebound and no guarding. Musculoskeletal:  Pt  exhibits no edema and no tenderness. Ext: Normal ROM in all four extremities; not tender to palpation; distal pulses are normal, no edema. Neurological:  Pt is alert. nonfocal neuro exam.  Skin: Skin is warm and dry. Pt  is not diaphoretic. Psychiatric:  Pt  has a normal mood and affect. Behavior is normal.     Note written by Steffi Up.  Ward Armando, as dictated by Anders Bowman DO 1: 62 AM    [facility]  ED Course       Procedures    CONSULT NOTE:  5:11 AM Anders Bowman DO spoke with Family Practice Resident, Consult for Hospitalist.  Discussed available diagnostic tests and clinical findings. She is in agreement with care plans as outlined. She will see and admit pt for further evaluation of treatment. Reviewed ct images    Had intractable pain and vomiting here     Needs admission - abx ordered. Labs Reviewed   URINALYSIS W/ REFLEX CULTURE - Abnormal; Notable for the following:        Result Value    Ketone 40 (*)     All other components within normal limits   METABOLIC PANEL, COMPREHENSIVE - Abnormal; Notable for the following:     Potassium 3.4 (*)     Glucose 126 (*)     All other components within normal limits   DRUG SCREEN, URINE - Abnormal; Notable for the following:     OPIATES POSITIVE (*)     THC (TH-CANNABINOL) POSITIVE (*)     All other components within normal limits   CBC WITH AUTOMATED DIFF - Abnormal; Notable for the following:     NEUTROPHILS 84 (*)     LYMPHOCYTES 10 (*)     ABS. NEUTROPHILS 9.3 (*)     All other components within normal limits   CBC WITH AUTOMATED DIFF - Abnormal; Notable for the following:     NEUTROPHILS 84 (*)     LYMPHOCYTES 10 (*)     ABS.  NEUTROPHILS 9.3 (*)     All other components within normal limits   METABOLIC PANEL, COMPREHENSIVE - Abnormal; Notable for the following:     Potassium 3.4 (*)     Glucose 126 (*)     All other components within normal limits   METABOLIC PANEL, COMPREHENSIVE - Abnormal; Notable for the following:     Potassium 3.4 (*)     Glucose 126 (*)     All other components within normal limits   LIPASE   MAGNESIUM   LIPASE   OCCULT BLOOD, STOOL   CBC WITH AUTOMATED DIFF   LIPASE   METABOLIC PANEL, COMPREHENSIVE

## 2017-01-18 NOTE — ED TRIAGE NOTES
The documentation for this period is being entered following the guidelines as defined in the [facility] policy by Denys Carmona RN.

## 2017-01-18 NOTE — H&P
[address] [place] [address]   Office [phone], Fax [phone]      History & Physical    Date of admission: 1/18/2017    Patient name: Sunny Zepeda  MRN: [id number]  YOB: 1990  Age: 27 y.o. Primary care provider:  Almaz Driver MD     Source of Information: patient and medical records                                      Subjective:       Chief complain: \" Im miserable\"    History of present illness:  Mr. Kelsey Alexander is a 27 y.o.  male with a history of peptic ulcer disease who is admitted with intractable vomiting and colitis. Mr. Kelsey Alexander presented to the Emergency Department today complaining of persistent epigastric abdominal pain with intractable vomiting and nausea starting, 2 days ago. Pt states he was initially vomiting stomach contents every hour 2 days ago. However, today pt reports vomiting at least every 2 hours. He denies hematemesis, but reports vomiting brown contents at times. Pt rates his pain 8/10, but now 0/10 after receiving dilaudid. Denies any sick contacts, recent illnesses, recent travel. Pt reports not eating any foods outside of routine meals with his last meal yesterday. Pt denies fevers,chills, shortness of breath, hematemesis, hematochezia, melena, hx of abdominal surgery, dysuria,. Pt was seen in the ED, yesterday for similar symptoms of vomiting, abdominal pain, diarrhea. He was sent home with bentyl and phenergan. At that time abdominal Xray was negative. WBC 13, CMP and lipase were wnl. Emergency Room Course:   Patient Vitals for the past 12 hrs:   Temp Pulse Resp BP SpO2   01/18/17 0700 - - - 127/73 98 %   01/18/17 0530 - - - 129/69 95 %   01/18/17 0137 97.9 °F (36.6 °C) 61 16 137/90 97 %      Labs: remarkable for LIpase 110,  WBC 11. 1, Hemoglobin 15.0, Cr. 1.16, K 3.4, Na 141. Abdominal CT scan showing colitis of the descending colon, without obstruction or abscess.   Abdominal Xray ( flat/ erect):  showing no acute process UA showed Ketone : 40 and negative:  leukocyte esterase, nitrites, protein, glucose, bacteria. Pt was treated with   Medications   levoFLOXacin (LEVAQUIN) 750 mg in D5W IVPB (not administered)   potassium chloride 10 mEq in 100 ml IVPB (not administered)   HYDROmorphone (PF) (DILAUDID) injection 1 mg (1 mg IntraVENous Given 17 0517)   cefTRIAXone (ROCEPHIN) 1 g in 0.9% sodium chloride (MBP/ADV) 50 mL (0 g IntraVENous IV Completed 17 0546)   metroNIDAZOLE (FLAGYL) IVPB premix 500 mg (500 mg IntraVENous New Bag 17 0551)   iopamidol (ISOVUE-370) 76 % injection 100 mL (94 mL IntraVENous Given 17 0430)         No Known Allergies    Prior to Admission Medications   Prescriptions Last Dose Informant Patient Reported? Taking?   dicyclomine (BENTYL) 20 mg tablet   No No   Sig: Take 1 Tab by mouth every six (6) hours for 20 doses. ondansetron (ZOFRAN ODT) 4 mg disintegrating tablet   No No   Sig: Take 1 Tab by mouth every eight (8) hours as needed for Nausea. promethazine (PHENERGAN) 25 mg suppository   No No   Sig: Insert 1 Suppository into rectum every six (6) hours as needed for Nausea for up to 7 days. Facility-Administered Medications: None       Past Medical History   Diagnosis Date    Gastric ulcer      due to drug use    Heart murmur of      Hx of drug abuse     Ulcer ([address]         Past Surgical History   Procedure Laterality Date    Hx other surgical       infant heart murmur          SOCIAL HISTORY    - Alcohol history: Not at all    - Smoking history: Non-smoker    - Illicit drug history: occasionally smokes marijuna    - Living arrangement: patient lives with their family. - Ambulates: Independently       Preventive history: There is no immunization history on file for this patient. CODE STATUS discussed with the patient/caregivers  FULL CODE      Review of Systems   Constitutional: Negative for chills, fever and malaise/fatigue.    Respiratory: Negative for cough, hemoptysis, sputum production, shortness of breath and wheezing. Cardiovascular: Negative for chest pain. Gastrointestinal: Positive for diarrhea, nausea and vomiting. Negative for abdominal pain, blood in stool and heartburn. Genitourinary: Negative for dysuria, flank pain and hematuria. Musculoskeletal: Negative for myalgias. Skin: Negative for itching and rash. Neurological: Positive for headaches. Negative for dizziness, tingling, sensory change, focal weakness and weakness. The remainder of the review of systems was reviewed and is noncontributory. Objective:      Patient Vitals for the past 12 hrs:   BP Temp Pulse Resp SpO2   17 0700 127/73 - - - 98 %   17 0530 129/69 - - - 95 %   17 0137 137/90 97.9 °F (36.6 °C) 61 16 97 %     Temp (24hrs), Av.9 °F (36.6 °C), Min:97.9 °F (36.6 °C), Max:97.9 °F (36.6 °C)    No intake or output data in the 24 hours ending 17 0726       O2 Device: Room air      Physical Exam  Visit Vitals    /73    Pulse 61    Temp 97.9 °F (36.6 °C)    Resp 16    SpO2 98%       Vitals Reviewed. General Oriented to person, place, and time and well-developed. Appears well-nourished, moderate distress. Not diaphoretic. HENT Head Normocephalic and atraumatic. Eyes Conjunctivae are normal, no discharge. No scleral icterus. Nose Nose normal, clear turbinates. Oral Oropharynx is clear and moist. No oropharyngeal exudate. Neck No thyromegaly present. No cervical adenopathy. Cardio Normal rate, regular rhythm. Exam reveals no gallop and no friction rub. No murmur heard. No chest wall tenderness. Pulmonary Effort normal and breath sounds normal. No respiratory distress. Mild wheezes on right upper lobes   No crackles, no rales    Abdominal Soft. Epigastric ttp. Bowel sounds hypoactive bowel sounds. No distension.  Deferred. Extremities No edema of lower extremities. No tenderness. Distal pulses intact. Neurological Alert and oriented to person, place, and time. Dermatology Skin is warm and dry. No rash noted. No erythema or pallor. Psychiatric Affect and judgment normal.        Data Review    Laboratory Data  Recent Results (from the past 8 hour(s))   URINALYSIS W/ REFLEX CULTURE    Collection Time: 01/18/17  5:26 AM   Result Value Ref Range    Color YELLOW/STRAW      Appearance CLEAR CLEAR      Specific gravity 1.010 1.003 - 1.030      pH (UA) 6.0 5.0 - 8.0      Protein NEGATIVE  NEG mg/dL    Glucose NEGATIVE  NEG mg/dL    Ketone 40 (A) NEG mg/dL    Bilirubin NEGATIVE  NEG      Blood NEGATIVE  NEG      Urobilinogen 0.2 0.2 - 1.0 EU/dL    Nitrites NEGATIVE  NEG      Leukocyte Esterase NEGATIVE  NEG      UA:UC IF INDICATED CULTURE NOT INDICATED BY UA RESULT CNI      WBC 0-4 0 - 4 /hpf    RBC 0-5 0 - 5 /hpf    Epithelial cells FEW FEW /lpf    Bacteria NEGATIVE  NEG /hpf    Hyaline Cast 0-2 0 - 5 /lpf       Imaging    CT Results  (Last 48 hours)               01/18/17 0658  CT ABD PELV W CONT Final result    Impression:  IMPRESSION:       1. Evidence of nonspecific infectious or inflammatory descending colitis. 2. No abscess or bowel obstruction. Narrative:  INDICATION: Abdominal pain, nausea, vomiting, and diarrhea for 2 days. COMPARISON: None       TECHNIQUE:    Following the uneventful intravenous administration of 94 cc Isovue-370, thin   axial images were obtained through the abdomen and pelvis. Coronal and sagittal   reconstructions were generated. Oral contrast was not administered. CT dose   reduction was achieved through use of a standardized protocol tailored for this   examination and automatic exposure control for dose modulation. FINDINGS:    LUNG BASES: Clear. INCIDENTALLY IMAGED HEART AND MEDIASTINUM: Unremarkable. LIVER: Heterogeneous enhancement suggests reaction to an inflammatory process   elsewhere in the abdomen. No evidence of mass or nodularity. GALLBLADDER: Unremarkable. SPLEEN: No mass. PANCREAS: No mass or ductal dilatation. ADRENALS: Unremarkable. KIDNEYS: No mass, calculus, or hydronephrosis. STOMACH: Nondistended. SMALL BOWEL: No dilatation or wall thickening. COLON: Incompletely distended. Subtle mural thickening of the descending colon. APPENDIX: Unremarkable. PERITONEUM: No ascites or pneumoperitoneum. RETROPERITONEUM: No lymphadenopathy or aortic aneurysm. REPRODUCTIVE ORGANS: Prostate gland is normal in size. URINARY BLADDER: No mass or calculus. BONES: No destructive bone lesion. ADDITIONAL COMMENTS: N/A               INDICATION: abdominal pain      COMPARISON: December 17, 2008     FINDINGS:     Supine and upright views of the abdomen demonstrate a nonobstructive bowel gas  pattern. There is no free air. No abnormal calcifications are identified. The  osseous structures are normal.     IMPRESSION  IMPRESSION:  No acute process. December 17, 2008     Abdominal CT scan:  showing infectious vs inflammatory colitis in the descending colon, without obstruction . Assessment and Plan     Polo Carter is a 27 y.o. male who is admitted for acute onset of colitis. Colitis : based on prelim abdominal CT scan: colitis of the descending colon. Pt denies any sick contacts  - IV Levaquin 750mg daily for 7 days   - IV Flagyl 500mg q8 for 7 days , next dose at 1400  - NPO for now, advance as tolerated  - F/U lactic acid   - F/U FOBT  - F/U UDS   - F/U EKG    PUD: not currently on medical management   - IV Protonix 40mg daily       FEN/GI - NPO. Advance as tolerated  NS at 100 mL/hr. Activity - Ambulate as tolerated  DVT prophylaxis - SCDs  GI prophylaxis - Protonix  Disposition - Admit to Medical. Plan to d/c to Home. Code Status - Full, discussed with patient / caregivers. Patient Polo Carter to be discussed Dr. Julianna Wilder (Attending Physician).     5:37 AM, 01/18/17  Alicja Mora MD  Family Medicine Resident    For Billing   Chief Complaint   Patient presents with   Moisés Barahona Vomiting       Hospital Problems  Date Reviewed: 7/29/2016    None

## 2017-01-18 NOTE — LETTER
Jeneane Curling 
[facility] EMERGENCY DEPT 
[address] [id number] [phone] Work/School Note Date: 1/18/2017 To Whom It May concern: 
 
Rangel Hernández was seen and treated today in the emergency room by the following provider(s): 
Attending Provider: Peg Moreland MD. Rangel Hernández may return to work on 1/21/17. Sincerely, Antolin Quintero RN

## 2017-01-18 NOTE — ED NOTES
The documentation for this period is being entered following the guidelines as defined in the [place] downtime policy by Madonna Chu RN.

## 2017-01-18 NOTE — PROGRESS NOTES
1/18/2017   CARE MANAGEMENT NOTE:  CM is following pt in the ER for initial discharge planning. EMR reviewed. CM met with pt who was his own historian for this needs assessment. Reportedly, pt resides with his wife and three sons in a tow story home with bedroom on the ground floor. There are three entry steps. PTA, pt was ambulatory, indepn with ADLs, and drives. He uses [email or web address] on [address]. Pt does not have home healthcare nor DME for home use. PCP is Dr. Darwin Cook. Plan is to return home when medically stable.   Ilia

## 2017-01-18 NOTE — PROGRESS NOTES
[facility]: MED RECONCILIATION    Comments/Recommendations:   Verifies no known allergies   reviewed no data available for this patient  Medications added:     · Promethazine tablete    Medications removed:    · None    Medications adjusted:    · None    Information obtained from: patient, emr    Significant PMH/Disease States: There is no problem list on file for this patient. Past Medical History   Diagnosis Date    Gastric ulcer      due to drug use    Heart murmur of      Hx of drug abuse     Ulcer [facility])          Chief Complaint for this Admission:   Chief Complaint   Patient presents with    Vomiting         Allergies: Review of patient's allergies indicates no known allergies. Prior to Admission Medications:     Medication Documentation Review Audit       Reviewed by PARISH VillatoroD (Pharmacist) on 17 at 0739         Medication Sig Documenting Provider Last Dose Status Taking?      dicyclomine (BENTYL) 20 mg tablet Take 1 Tab by mouth every six (6) hours for 20 doses. CARRILLO Wolfe 2017 Unknown time Active Yes    ondansetron (ZOFRAN ODT) 4 mg disintegrating tablet Take 1 Tab by mouth every eight (8) hours as needed for Nausea. CARRILLO Mcmahon  Active Yes    promethazine (PHENERGAN) 25 mg suppository Insert 1 Suppository into rectum every six (6) hours as needed for Nausea for up to 7 days. CARRILLO Mcmahon  Active Yes    promethazine (PHENERGAN) 25 mg tablet Take 25 mg by mouth every six (6) hours as needed for Nausea.  Historical Provider 2017 Unknown time Active Yes                  Thank you,      Jess Amaya, ParishD, BCPS

## 2017-01-19 NOTE — DISCHARGE SUMMARY
[address]                                                                                DISCHARGE SUMMARY     Patient: Brooklyn Fan  MRN: [id number]  YOB: 1990  Age: 27 y.o. Date of admission:  1/18/2017  Date of discharge:  1/18/2017  Primary care provider:  Mark Anthony Guzmán MD   Admitting provider:  Irma Mnuoz MD    Discharging provider(s): Hailee Galvez DO - Family Medicine Resident  Dr. Silvia Quijano MD -  Family Medicine Attending      Consultations:  IP CONSULT TO FAMILY PRACTICE    Procedures:  · None    Discharge destination: Home. The patient is stable for discharge. Admission diagnosis:  Colitis    HPI:   Mr. Neema Woodall is a 27 y.o. male with a history of peptic ulcer disease who is admitted with intractable vomiting and colitis. Mr. Neema Woodall presented to the Emergency Department today complaining of persistent epigastric abdominal pain with intractable vomiting and nausea starting, 2 days ago. Pt states he was initially vomiting stomach contents every hour 2 days ago. However, today pt reports vomiting at least every 2 hours. He denies hematemesis, but reports vomiting brown contents at times. Pt rates his pain 8/10, but now 0/10 after receiving dilaudid. Denies any sick contacts, recent illnesses, recent travel. Pt reports not eating any foods outside of routine meals with his last meal yesterday. Pt denies fevers,chills, shortness of breath, hematemesis, hematochezia, melena, hx of abdominal surgery, dysuria,.      Pt was seen in the ED, yesterday for similar symptoms of vomiting, abdominal pain, diarrhea. He was sent home with bentyl and phenergan. At that time abdominal Xray was negative. WBC 13, CMP and lipase were wnl.    Emergency Room Course:   Patient Vitals for the past 12 hrs:    Temp Pulse Resp BP SpO2   01/18/17 0700 - - - 127/73 98 %   01/18/17 0530 - - - 129/69 95 %   01/18/17 0137 97.9 °F (36.6 °C) 61 16 137/90 97 % Labs: remarkable for LIpase 110, WBC 11. 1, Hemoglobin 15.0, Cr. 1.16, K 3.4, Na 141. Abdominal CT scan showing colitis of the descending colon, without obstruction or abscess. Abdominal Xray ( flat/ erect):  showing no acute process   UA showed Ketone : 40 and negative: leukocyte esterase, nitrites, protein, glucose, bacteria. Pt was treated with   Medications   levoFLOXacin (LEVAQUIN) 750 mg in D5W IVPB (not administered)   potassium chloride 10 mEq in 100 ml IVPB (not administered)   HYDROmorphone (PF) (DILAUDID) injection 1 mg (1 mg IntraVENous Given 1/18/17 0517)   cefTRIAXone (ROCEPHIN) 1 g in 0.9% sodium chloride (MBP/ADV) 50 mL (0 g IntraVENous IV Completed 1/18/17 0546)   metroNIDAZOLE (FLAGYL) IVPB premix 500 mg (500 mg IntraVENous New Bag 1/18/17 0551)   iopamidol (ISOVUE-370) 76 % injection 100 mL (94 mL IntraVENous Given 1/18/17 0430)        Final discharge diagnoses and brief hospital course:   Colitis/Gastroententritis : Abdominal CT scan: colitis of the descending colon. Area of inflammation was not c/w pt's complaint and exam findings. CBC, CMP, lipase and UA essentially wnl. Hgb stable. UDS taken after admitted and was positive for opiates and THC. Given IV Levaquin 750mg and IV Flagyl 500mg. Diet was advanced and pt tolerated. Nausea treated with Zofran and phenergan. Pt was discharged with PRN medication for nausea and instructions to advance diet slowly. -F/u with PCP if symptoms return. Sandi Stafford Hx of PUD: Given IV Protonix 40mg. Pt tolerated medications well. -F/u with PCP to discuss adding PPI.     Follow-up Cares:   · Pending LABS at the time of Discharge: None  · Labs Need to Repeat by PCP: None    Follow-up Information     Follow up With Details Comments Contact Info    Eleazar Clark MD Schedule an appointment as soon as possible for a visit in 1 week  [facility] [address] [phone]              Physical Examination at Discharge:     Visit Vitals    /74 (BP 1 Location: Right arm, BP Patient Position: At rest)    Pulse 74    Temp 97.9 °F (36.6 °C)    Resp 18    SpO2 98%       General Oriented to person, place, and time and well-developed. Appears well-nourished, moderate distress. Not diaphoretic. HENT Head Normocephalic and atraumatic. Eyes Conjunctivae are normal, no discharge. No scleral icterus. Nose Nose normal, clear turbinates. Oral Oropharynx is clear and moist. No oropharyngeal exudate. Neck No thyromegaly present. No cervical adenopathy. Cardio Normal rate, regular rhythm. Exam reveals no gallop and no friction rub. No murmur heard. No chest wall tenderness. Pulmonary Effort normal and breath sounds normal. No respiratory distress. Mild wheezes on right upper lobes   No crackles, no rales    Abdominal Soft. Epigastric ttp. Bowel sounds hypoactive bowel sounds. No distension.  Deferred. Extremities No edema of lower extremities. No tenderness. Distal pulses intact. Neurological Alert and oriented to person, place, and time. Dermatology Skin is warm and dry. No rash noted. No erythema or pallor. Psychiatric Affect and judgment normal.             Pertinent Imaging Studies:     Xr Abd Flat/ Erect    Result Date: 1/17/2017  INDICATION:   abdominal pain COMPARISON: December 17, 2008 FINDINGS: Supine and upright views of the abdomen demonstrate a nonobstructive bowel gas pattern. There is no free air. No abnormal calcifications are identified. The osseous structures are normal.     IMPRESSION: No acute process. Ct Abd Pelv W Cont    Result Date: 1/18/2017  INDICATION: Abdominal pain, nausea, vomiting, and diarrhea for 2 days. COMPARISON: None TECHNIQUE: Following the uneventful intravenous administration of 94 cc Isovue-370, thin axial images were obtained through the abdomen and pelvis. Coronal and sagittal reconstructions were generated. Oral contrast was not administered.  CT dose reduction was achieved through use of a standardized protocol tailored for this examination and automatic exposure control for dose modulation. FINDINGS: LUNG BASES: Clear. INCIDENTALLY IMAGED HEART AND MEDIASTINUM: Unremarkable. LIVER: Heterogeneous enhancement suggests reaction to an inflammatory process elsewhere in the abdomen. No evidence of mass or nodularity. GALLBLADDER: Unremarkable. SPLEEN: No mass. PANCREAS: No mass or ductal dilatation. ADRENALS: Unremarkable. KIDNEYS: No mass, calculus, or hydronephrosis. STOMACH: Nondistended. SMALL BOWEL: No dilatation or wall thickening. COLON: Incompletely distended. Subtle mural thickening of the descending colon. APPENDIX: Unremarkable. PERITONEUM: No ascites or pneumoperitoneum. RETROPERITONEUM: No lymphadenopathy or aortic aneurysm. REPRODUCTIVE ORGANS: Prostate gland is normal in size. URINARY BLADDER: No mass or calculus. BONES: No destructive bone lesion. ADDITIONAL COMMENTS: N/A     IMPRESSION: 1. Evidence of nonspecific infectious or inflammatory descending colitis. 2. No abscess or bowel obstruction.     ---------------------------------    Discharge Medications:      Discharge Medication List as of 1/18/2017  5:08 PM      CONTINUE these medications which have NOT CHANGED    Details   promethazine (PHENERGAN) 25 mg tablet Take 25 mg by mouth every six (6) hours as needed for Nausea., Historical Med      ondansetron (ZOFRAN ODT) 4 mg disintegrating tablet Take 1 Tab by mouth every eight (8) hours as needed for Nausea. , Print, Disp-20 Tab, R-0         STOP taking these medications       promethazine (PHENERGAN) 25 mg suppository Comments:   Reason for Stopping:         dicyclomine (BENTYL) 20 mg tablet Comments:   Reason for Stopping:               Chronic Diagnoses:    Problem List as of 1/18/2017  Date Reviewed: 7/29/2016          Codes Class Noted - Resolved    * (Principal)Colitis ICD-10-CM: K52.9  ICD-9-CM: 558.9  1/18/2017 - Present              Signed: Leyla Shin DO   Family Medicine Resident      1/18/2017   7:54 PM     Cc:  Preet Phillips MD

## 2017-08-30 ENCOUNTER — HOSPITAL ENCOUNTER (EMERGENCY)
Age: 27
Discharge: HOME | End: 2017-08-30
Attending: EMERGENCY MEDICINE
Payer: COMMERCIAL

## 2017-08-30 ENCOUNTER — APPOINTMENT (OUTPATIENT)
Dept: GENERAL RADIOLOGY | Age: 27
End: 2017-08-30
Attending: EMERGENCY MEDICINE
Payer: COMMERCIAL

## 2017-08-30 VITALS
OXYGEN SATURATION: 96 % | HEART RATE: 78 BPM | RESPIRATION RATE: 18 BRPM | BODY MASS INDEX: 24.78 KG/M2 | HEIGHT: 67 IN | DIASTOLIC BLOOD PRESSURE: 57 MMHG | TEMPERATURE: 99 F | SYSTOLIC BLOOD PRESSURE: 107 MMHG | WEIGHT: 157.85 LBS

## 2017-08-30 DIAGNOSIS — R11.2 NON-INTRACTABLE VOMITING WITH NAUSEA, UNSPECIFIED VOMITING TYPE: ICD-10-CM

## 2017-08-30 DIAGNOSIS — K21.9 GASTROESOPHAGEAL REFLUX DISEASE WITHOUT ESOPHAGITIS: Primary | ICD-10-CM

## 2017-08-30 LAB
ALBUMIN SERPL-MCNC: 4.6 G/DL (ref 3.5–5)
ALBUMIN/GLOB SERPL: 1.3 {RATIO} (ref 1.1–2.2)
ALP SERPL-CCNC: 80 U/L (ref 45–117)
ALT SERPL-CCNC: 30 U/L (ref 12–78)
ANION GAP SERPL CALC-SCNC: 12 MMOL/L (ref 5–15)
AST SERPL-CCNC: 17 U/L (ref 15–37)
BASOPHILS # BLD: 0 K/UL (ref 0–0.1)
BASOPHILS NFR BLD: 0 % (ref 0–1)
BILIRUB SERPL-MCNC: 0.7 MG/DL (ref 0.2–1)
BUN SERPL-MCNC: 14 MG/DL (ref 6–20)
BUN/CREAT SERPL: 10 (ref 12–20)
CALCIUM SERPL-MCNC: 10.2 MG/DL (ref 8.5–10.1)
CHLORIDE SERPL-SCNC: 102 MMOL/L (ref 97–108)
CO2 SERPL-SCNC: 28 MMOL/L (ref 21–32)
CREAT SERPL-MCNC: 1.37 MG/DL (ref 0.7–1.3)
DIFFERENTIAL METHOD BLD: ABNORMAL
EOSINOPHIL # BLD: 0 K/UL (ref 0–0.4)
EOSINOPHIL NFR BLD: 0 % (ref 0–7)
ERYTHROCYTE [DISTWIDTH] IN BLOOD BY AUTOMATED COUNT: 12.6 % (ref 11.5–14.5)
GLOBULIN SER CALC-MCNC: 3.6 G/DL (ref 2–4)
GLUCOSE SERPL-MCNC: 174 MG/DL (ref 65–100)
HCT VFR BLD AUTO: 42.4 % (ref 36.6–50.3)
HGB BLD-MCNC: 15.3 G/DL (ref 12.1–17)
LIPASE SERPL-CCNC: 83 U/L (ref 73–393)
LYMPHOCYTES # BLD: 0.8 K/UL (ref 0.8–3.5)
LYMPHOCYTES NFR BLD: 5 % (ref 12–49)
MCH RBC QN AUTO: 31.4 PG (ref 26–34)
MCHC RBC AUTO-ENTMCNC: 36.1 G/DL (ref 30–36.5)
MCV RBC AUTO: 86.9 FL (ref 80–99)
MONOCYTES # BLD: 0.8 K/UL (ref 0–1)
MONOCYTES NFR BLD: 5 % (ref 5–13)
NEUTS SEG # BLD: 14.2 K/UL (ref 1.8–8)
NEUTS SEG NFR BLD: 90 % (ref 32–75)
PLATELET # BLD AUTO: 342 K/UL (ref 150–400)
POTASSIUM SERPL-SCNC: 4 MMOL/L (ref 3.5–5.1)
PROT SERPL-MCNC: 8.2 G/DL (ref 6.4–8.2)
RBC # BLD AUTO: 4.88 M/UL (ref 4.1–5.7)
RBC MORPH BLD: ABNORMAL
SODIUM SERPL-SCNC: 142 MMOL/L (ref 136–145)
WBC # BLD AUTO: 15.8 K/UL (ref 4.1–11.1)

## 2017-08-30 PROCEDURE — 99284 EMERGENCY DEPT VISIT MOD MDM: CPT

## 2017-08-30 PROCEDURE — 74022 RADEX COMPL AQT ABD SERIES: CPT

## 2017-08-30 PROCEDURE — 74011250636 HC RX REV CODE- 250/636: Performed by: EMERGENCY MEDICINE

## 2017-08-30 PROCEDURE — 85025 COMPLETE CBC W/AUTO DIFF WBC: CPT | Performed by: EMERGENCY MEDICINE

## 2017-08-30 PROCEDURE — 36415 COLL VENOUS BLD VENIPUNCTURE: CPT | Performed by: EMERGENCY MEDICINE

## 2017-08-30 PROCEDURE — 96375 TX/PRO/DX INJ NEW DRUG ADDON: CPT

## 2017-08-30 PROCEDURE — 83690 ASSAY OF LIPASE: CPT | Performed by: EMERGENCY MEDICINE

## 2017-08-30 PROCEDURE — 74011000250 HC RX REV CODE- 250: Performed by: EMERGENCY MEDICINE

## 2017-08-30 PROCEDURE — 80053 COMPREHEN METABOLIC PANEL: CPT | Performed by: EMERGENCY MEDICINE

## 2017-08-30 PROCEDURE — 96361 HYDRATE IV INFUSION ADD-ON: CPT

## 2017-08-30 PROCEDURE — 96374 THER/PROPH/DIAG INJ IV PUSH: CPT

## 2017-08-30 RX ORDER — LORAZEPAM 2 MG/ML
1 INJECTION INTRAMUSCULAR
Status: COMPLETED | OUTPATIENT
Start: 2017-08-30 | End: 2017-08-30

## 2017-08-30 RX ORDER — OMEPRAZOLE 20 MG/1
20 CAPSULE, DELAYED RELEASE ORAL DAILY
Qty: 30 CAP | Refills: 0 | Status: SHIPPED | OUTPATIENT
Start: 2017-08-30 | End: 2017-08-30

## 2017-08-30 RX ORDER — FAMOTIDINE 10 MG/ML
20 INJECTION INTRAVENOUS
Status: COMPLETED | OUTPATIENT
Start: 2017-08-30 | End: 2017-08-30

## 2017-08-30 RX ORDER — ONDANSETRON 2 MG/ML
8 INJECTION INTRAMUSCULAR; INTRAVENOUS
Status: COMPLETED | OUTPATIENT
Start: 2017-08-30 | End: 2017-08-30

## 2017-08-30 RX ORDER — OMEPRAZOLE 20 MG/1
20 CAPSULE, DELAYED RELEASE ORAL DAILY
Qty: 30 CAP | Refills: 0 | Status: ON HOLD | OUTPATIENT
Start: 2017-08-30 | End: 2017-09-02

## 2017-08-30 RX ORDER — PROMETHAZINE HYDROCHLORIDE 25 MG/1
25 TABLET ORAL
Qty: 12 TAB | Refills: 0 | Status: SHIPPED | OUTPATIENT
Start: 2017-08-30 | End: 2020-01-16

## 2017-08-30 RX ORDER — ONDANSETRON 2 MG/ML
INJECTION INTRAMUSCULAR; INTRAVENOUS
Status: DISPENSED
Start: 2017-08-30 | End: 2017-08-30

## 2017-08-30 RX ORDER — SUCRALFATE 1 G/10ML
1 SUSPENSION ORAL 4 TIMES DAILY
Qty: 414 ML | Refills: 0 | Status: SHIPPED | OUTPATIENT
Start: 2017-08-30 | End: 2020-01-16

## 2017-08-30 RX ORDER — LORAZEPAM 2 MG/ML
INJECTION INTRAMUSCULAR
Status: DISCONTINUED
Start: 2017-08-30 | End: 2017-08-30 | Stop reason: HOSPADM

## 2017-08-30 RX ORDER — FAMOTIDINE 10 MG/ML
INJECTION INTRAVENOUS
Status: DISPENSED
Start: 2017-08-30 | End: 2017-08-30

## 2017-08-30 RX ADMIN — ONDANSETRON 8 MG: 2 INJECTION INTRAMUSCULAR; INTRAVENOUS at 01:39

## 2017-08-30 RX ADMIN — LORAZEPAM 1 MG: 2 INJECTION INTRAMUSCULAR; INTRAVENOUS at 01:39

## 2017-08-30 RX ADMIN — FAMOTIDINE 20 MG: 10 INJECTION, SOLUTION INTRAVENOUS at 01:39

## 2017-08-30 RX ADMIN — SODIUM CHLORIDE 2000 ML: 900 INJECTION, SOLUTION INTRAVENOUS at 01:39

## 2017-08-30 NOTE — DISCHARGE INSTRUCTIONS
Gastroesophageal Reflux Disease (GERD): Care Instructions  Your Care Instructions    Gastroesophageal reflux disease (GERD) is the backward flow of stomach acid into the esophagus. The esophagus is the tube that leads from your throat to your stomach. A one-way valve prevents the stomach acid from moving up into this tube. When you have GERD, this valve does not close tightly enough. If you have mild GERD symptoms including heartburn, you may be able to control the problem with antacids or over-the-counter medicine. Changing your diet, losing weight, and making other lifestyle changes can also help reduce symptoms. Follow-up care is a key part of your treatment and safety. Be sure to make and go to all appointments, and call your doctor if you are having problems. Its also a good idea to know your test results and keep a list of the medicines you take. How can you care for yourself at home? · Take your medicines exactly as prescribed. Call your doctor if you think you are having a problem with your medicine. · Your doctor may recommend over-the-counter medicine. For mild or occasional indigestion, antacids, such as Tums, Gaviscon, Mylanta, or Maalox, may help. Your doctor also may recommend over-the-counter acid reducers, such as Pepcid AC, Tagamet HB, Zantac 75, or Prilosec. Read and follow all instructions on the label. If you use these medicines often, talk with your doctor. · Change your eating habits. ¨ Its best to eat several small meals instead of two or three large meals. ¨ After you eat, wait 2 to 3 hours before you lie down. ¨ Chocolate, mint, and alcohol can make GERD worse. ¨ Spicy foods, foods that have a lot of acid (like tomatoes and oranges), and coffee can make GERD symptoms worse in some people. If your symptoms are worse after you eat a certain food, you may want to stop eating that food to see if your symptoms get better.   · Do not smoke or chew tobacco. Smoking can make GERD worse. If you need help quitting, talk to your doctor about stop-smoking programs and medicines. These can increase your chances of quitting for good. · If you have GERD symptoms at night, raise the head of your bed 6 to 8 inches by putting the frame on blocks or placing a foam wedge under the head of your mattress. (Adding extra pillows does not work.)  · Do not wear tight clothing around your middle. · Lose weight if you need to. Losing just 5 to 10 pounds can help. When should you call for help? Call your doctor now or seek immediate medical care if:  · You have new or different belly pain. · Your stools are black and tarlike or have streaks of blood. Watch closely for changes in your health, and be sure to contact your doctor if:  · Your symptoms have not improved after 2 days. · Food seems to catch in your throat or chest.  Where can you learn more? Go to http://isaac-zoraida.info/. Enter V158 in the search box to learn more about \"Gastroesophageal Reflux Disease (GERD): Care Instructions. \"  Current as of: August 9, 2016  Content Version: 11.3  © 3557-3532 [employer]. Care instructions adapted under license by [employer] (which disclaims liability or warranty for this information). If you have questions about a medical condition or this instruction, always ask your healthcare professional. [address] any warranty or liability for your use of this information. Nausea and Vomiting: Care Instructions  Your Care Instructions    When you are nauseated, you may feel weak and sweaty and notice a lot of saliva in your mouth. Nausea often leads to vomiting. Most of the time you do not need to worry about nausea and vomiting, but they can be signs of other illnesses. Two common causes of nausea and vomiting are stomach flu and food poisoning. Nausea and vomiting from viral stomach flu will usually start to improve within 24 hours. Nausea and vomiting from food poisoning may last from 12 to 48 hours. The doctor has checked you carefully, but problems can develop later. If you notice any problems or new symptoms, get medical treatment right away. Follow-up care is a key part of your treatment and safety. Be sure to make and go to all appointments, and call your doctor if you are having problems. It's also a good idea to know your test results and keep a list of the medicines you take. How can you care for yourself at home? · To prevent dehydration, drink plenty of fluids, enough so that your urine is light yellow or clear like water. Choose water and other caffeine-free clear liquids until you feel better. If you have kidney, heart, or liver disease and have to limit fluids, talk with your doctor before you increase the amount of fluids you drink. · Rest in bed until you feel better. · When you are able to eat, try clear soups, mild foods, and liquids until all symptoms are gone for 12 to 48 hours. Other good choices include dry toast, crackers, cooked cereal, and gelatin dessert, such as Jell-O. When should you call for help? Call 911 anytime you think you may need emergency care. For example, call if:  · You passed out (lost consciousness). Call your doctor now or seek immediate medical care if:  · You have symptoms of dehydration, such as:  ¨ Dry eyes and a dry mouth. ¨ Passing only a little dark urine. ¨ Feeling thirstier than usual.  · You have new or worsening belly pain. · You have a new or higher fever. · You vomit blood or what looks like coffee grounds. Watch closely for changes in your health, and be sure to contact your doctor if:  · You have ongoing nausea and vomiting. · Your vomiting is getting worse. · Your vomiting lasts longer than 2 days. · You are not getting better as expected. Where can you learn more? Go to http://isaac-zoraida.info/.   Enter 24 [id number] in the search box to learn more about \"Nausea and Vomiting: Care Instructions. \"  Current as of: March 20, 2017  Content Version: 11.3  © 4148-4502 [employer]. Care instructions adapted under license by [employer] (which disclaims liability or warranty for this information). If you have questions about a medical condition or this instruction, always ask your healthcare professional. [address] any warranty or liability for your use of this information. We hope that we have addressed all of your medical concerns. The examination and treatment you received in the Emergency Department were for an emergent problem and were not intended as complete care. It is important that you follow up with your healthcare provider(s) for ongoing care. If your symptoms worsen or do not improve as expected, and you are unable to reach your usual health care provider(s), you should return to the Emergency Department. Today's healthcare is undergoing tremendous change, and patient satisfaction surveys are one of the many tools to assess the quality of medical care. You may receive a survey from the [employer] regarding your experience in the Emergency Department. I hope that your experience has been completely positive, particularly the medical care that I provided. As such, please participate in the survey; anything less than excellent does not meet my expectations or intentions. [facility] and [facility] participate in nationally recognized quality of care measures. If your blood pressure is greater than 120/80, as reported below, we urge that you seek medical care to address the potential of high blood pressure, commonly known as hypertension. Hypertension can be hereditary or can be caused by certain medical conditions, pain, stress, or \"white coat syndrome. \"       Please make an appointment with your health care provider(s) for follow up of your Emergency Department visit. VITALS:   Patient Vitals for the past 8 hrs:   Temp Pulse Resp BP SpO2   08/30/17 0345 - - - 104/65 97 %   08/30/17 0300 - - - 111/60 97 %   08/30/17 0116 99 °F (37.2 °C) 78 18 130/78 98 %          Thank you for allowing us to provide you with medical care today. We realize that you have many choices for your emergency care needs. Please choose us in the future for any continued health care needs. Ehsan Falcon MD    [facility], [facility].   Office: [phone]            Recent Results (from the past 24 hour(s))   CBC WITH AUTOMATED DIFF    Collection Time: 08/30/17  1:30 AM   Result Value Ref Range    WBC 15.8 (H) 4.1 - 11.1 K/uL    RBC 4.88 4.10 - 5.70 M/uL    HGB 15.3 12.1 - 17.0 g/dL    HCT 42.4 36.6 - 50.3 %    MCV 86.9 80.0 - 99.0 FL    MCH 31.4 26.0 - 34.0 PG    MCHC 36.1 30.0 - 36.5 g/dL    RDW 12.6 11.5 - 14.5 %    PLATELET 526 922 - 749 K/uL    NEUTROPHILS 90 (H) 32 - 75 %    LYMPHOCYTES 5 (L) 12 - 49 %    MONOCYTES 5 5 - 13 %    EOSINOPHILS 0 0 - 7 %    BASOPHILS 0 0 - 1 %    ABS. NEUTROPHILS 14.2 (H) 1.8 - 8.0 K/UL    ABS. LYMPHOCYTES 0.8 0.8 - 3.5 K/UL    ABS. MONOCYTES 0.8 0.0 - 1.0 K/UL    ABS. EOSINOPHILS 0.0 0.0 - 0.4 K/UL    ABS. BASOPHILS 0.0 0.0 - 0.1 K/UL    RBC COMMENTS NORMOCYTIC, NORMOCHROMIC      DF SMEAR SCANNED     METABOLIC PANEL, COMPREHENSIVE    Collection Time: 08/30/17  1:30 AM   Result Value Ref Range    Sodium 142 136 - 145 mmol/L    Potassium 4.0 3.5 - 5.1 mmol/L    Chloride 102 97 - 108 mmol/L    CO2 28 21 - 32 mmol/L    Anion gap 12 5 - 15 mmol/L    Glucose 174 (H) 65 - 100 mg/dL    BUN 14 6 - 20 MG/DL    Creatinine 1.37 (H) 0.70 - 1.30 MG/DL    BUN/Creatinine ratio 10 (L) 12 - 20      GFR est AA >60 >60 ml/min/1.73m2    GFR est non-AA >60 >60 ml/min/1.73m2    Calcium 10.2 (H) 8.5 - 10.1 MG/DL    Bilirubin, total 0.7 0.2 - 1.0 MG/DL    ALT (SGPT) 30 12 - 78 U/L    AST (SGOT) 17 15 - 37 U/L    Alk. phosphatase 80 45 - 117 U/L    Protein, total 8.2 6.4 - 8.2 g/dL    Albumin 4.6 3.5 - 5.0 g/dL    Globulin 3.6 2.0 - 4.0 g/dL    A-G Ratio 1.3 1.1 - 2.2     LIPASE    Collection Time: 08/30/17  1:30 AM   Result Value Ref Range    Lipase 83 73 - 393 U/L       Xr Abd Acute W 1 V Chest    Result Date: 8/30/2017  Clinical indication: Abdominal pain. AP upright view of the chest, supine and upright views of the abdomen obtained. There is no free air. The gas pattern is nonspecific. IMPRESSION: Nonspecific gas pattern.

## 2017-08-30 NOTE — Clinical Note
Take the antacid medication daily. You can use the Carafate before each meal. 
 
Follow up with the gastroenterologist for further evaluation of your symptoms. Return to the ED if your pain worsens, fever, recurrent vomiting.

## 2017-08-30 NOTE — LETTER
[address] 
[facility] EMERGENCY DEPT 
[address] [facility] [address] [address] [address] [phone] Work/School Note Date: 8/30/2017 To Whom It May concern: 
 
Serge German was seen and treated today in the emergency room by the following provider(s): 
Attending Provider: Temi Cunha MD. Serge German may return to work on Sept 1, 2017.  
 
Sincerely, 
 
 
 
 
Temi Cunha MD

## 2017-08-30 NOTE — ED PROVIDER NOTES
HPI Comments: 32 y.o. male with past medical history significant for gastric ulcer due to drug use, who presents from home with chief complaint of severe epigastric abdominal pain, onset at 1400 yesterday (2017) after eating a burger from [employer]. Pt reports that the pain radiates through to the back, and is currently a 9/10 in severity and \"sharp\" in quality. He additionally c/o nausea with vomiting, which has not been relieved with Zofran at home. Pt also states that he has been taking Pepto-Bismol without any relief. Pt denies any known sick contacts, h/o abdominal surgery, h/o pancreatitis, and he denies having a current prescription for antacids. He also denies EtOH intake or use of NSAIDs. Pt specifically denies hematemesis, diarrhea, constipation, difficulty urinating or other urinary sx, fevers, or having a gastroenterologist that he regularly sees. Of note, pt states that he drove himself to the ED this morning, but states that he can get a ride home if needed. There are no other acute medical concerns at this time. Per EMR, pt has been seen in the past for similar sx, most recently in 2017. He was admitted to the hospital and discharged on 2017 for intractable vomiting and colitis seen on abdominal CT. Social hx: + Tobacco (current some day smoker), - EtOH, - Illicit Drugs (patient denies, but chart review is positive for marijuana abuse)   PCP: Esteban Aguilera MD  GI: None     Note written by Jazmine Butterfield. Yoselyn Khan, as dictated by Beatriz Garg MD 1:24 AM     The history is provided by the patient. No  was used.         Past Medical History:   Diagnosis Date    Gastric ulcer     due to drug use    Heart murmur of      Hx of drug abuse     Ulcer ([facility] [address]        Past Surgical History:   Procedure Laterality Date    HX OTHER SURGICAL      infant heart murmur         Family History:   Problem Relation Age of Onset    Migraines Mother Social History     Social History    Marital status:      Spouse name: N/A    Number of children: N/A    Years of education: N/A     Occupational History    Not on file. Social History Main Topics    Smoking status: Current Some Day Smoker    Smokeless tobacco: Never Used    Alcohol use No    Drug use: Yes     Special: Marijuana    Sexual activity: Yes     Partners: Female     Other Topics Concern    Not on file     Social History Narrative    ** Merged History Encounter **              ALLERGIES: Review of patient's allergies indicates no known allergies. Review of Systems   Constitutional: Negative for fever. HENT: Negative. Eyes: Negative. Respiratory: Negative. Cardiovascular: Negative. Gastrointestinal: Positive for abdominal pain, nausea and vomiting. Negative for constipation and diarrhea. Negative for hematemesis   Genitourinary: Negative. Negative for difficulty urinating. Musculoskeletal: Positive for back pain. Skin: Negative. Neurological: Negative. All other systems reviewed and are negative. Vitals:    08/30/17 0116   BP: 130/78   Pulse: 78   Resp: 18   Temp: 99 °F (37.2 °C)   SpO2: 98%   Weight: 71.6 kg (157 lb 13.6 oz)   Height: 5' 7\" (1.702 m)            Physical Exam   Constitutional: He is oriented to person, place, and time. He appears well-developed and well-nourished. HENT:   Head: Normocephalic and atraumatic. Nose: Nose normal.   Eyes: Conjunctivae and EOM are normal. Pupils are equal, round, and reactive to light. Neck: Normal range of motion. Neck supple. Cardiovascular: Normal rate, regular rhythm, normal heart sounds and intact distal pulses. Pulmonary/Chest: Effort normal and breath sounds normal.   Abdominal: Soft. Bowel sounds are normal. There is tenderness in the epigastric area. Musculoskeletal: Normal range of motion. Neurological: He is oriented to person, place, and time. He has normal reflexes. Skin: Skin is warm and dry. Psychiatric: He has a normal mood and affect. His behavior is normal.   Nursing note and vitals reviewed. Note written by Leisa Quintana. Rhianna Hood, as dictated by Dez Jones MD 1:24 AM        [facility]  ED Course       Procedures      Feeling better after zofran, ativan and fluids. Patient sleeping. SLight elevated WBC but no fever with benign abdominal exam- likely stress demargination. Patient sleeping. Will po challenge once awake. Dez Jones MD  6:19 AM  Patient awake, overall feels much better. Taking ice chips and ginger ale now. States he is supposed to be on ulcer medication but he doesn't like to rely on medication so he stopped taking it. Does not have a GI doctor and hasn't had an endoscopy in 8-9 years. Will start PPI's, anti-nausea meds and follow up with GI. Return for increased pain, recurrent vomiting rm.  If bloody, fever, etc.

## 2017-09-01 ENCOUNTER — HOSPITAL ENCOUNTER (OUTPATIENT)
Age: 27
Setting detail: OBSERVATION
Discharge: HOME | End: 2017-09-02
Attending: EMERGENCY MEDICINE | Admitting: FAMILY MEDICINE
Payer: COMMERCIAL

## 2017-09-01 ENCOUNTER — APPOINTMENT (OUTPATIENT)
Dept: CT IMAGING | Age: 27
End: 2017-09-01
Attending: FAMILY MEDICINE
Payer: COMMERCIAL

## 2017-09-01 DIAGNOSIS — R11.2 NON-INTRACTABLE VOMITING WITH NAUSEA, UNSPECIFIED VOMITING TYPE: Primary | ICD-10-CM

## 2017-09-01 DIAGNOSIS — K29.90 GASTRITIS AND DUODENITIS: ICD-10-CM

## 2017-09-01 LAB
ALBUMIN SERPL-MCNC: 4.4 G/DL (ref 3.5–5)
ALBUMIN/GLOB SERPL: 1.4 {RATIO} (ref 1.1–2.2)
ALP SERPL-CCNC: 70 U/L (ref 45–117)
ALT SERPL-CCNC: 23 U/L (ref 12–78)
AMPHET UR QL SCN: NEGATIVE
ANION GAP SERPL CALC-SCNC: 10 MMOL/L (ref 5–15)
APPEARANCE UR: CLEAR
AST SERPL-CCNC: 13 U/L (ref 15–37)
BACTERIA URNS QL MICRO: NEGATIVE /HPF
BARBITURATES UR QL SCN: NEGATIVE
BASOPHILS # BLD: 0 K/UL (ref 0–0.1)
BASOPHILS NFR BLD: 0 % (ref 0–1)
BENZODIAZ UR QL: NEGATIVE
BILIRUB SERPL-MCNC: 0.7 MG/DL (ref 0.2–1)
BILIRUB UR QL: NEGATIVE
BUN SERPL-MCNC: 13 MG/DL (ref 6–20)
BUN/CREAT SERPL: 11 (ref 12–20)
CALCIUM SERPL-MCNC: 9.5 MG/DL (ref 8.5–10.1)
CANNABINOIDS UR QL SCN: POSITIVE
CHLORIDE SERPL-SCNC: 102 MMOL/L (ref 97–108)
CO2 SERPL-SCNC: 28 MMOL/L (ref 21–32)
COCAINE UR QL SCN: NEGATIVE
COLOR UR: ABNORMAL
CREAT SERPL-MCNC: 1.16 MG/DL (ref 0.7–1.3)
DRUG SCRN COMMENT,DRGCM: ABNORMAL
EOSINOPHIL # BLD: 0 K/UL (ref 0–0.4)
EOSINOPHIL NFR BLD: 0 % (ref 0–7)
EPITH CASTS URNS QL MICRO: ABNORMAL /LPF
ERYTHROCYTE [DISTWIDTH] IN BLOOD BY AUTOMATED COUNT: 12.4 % (ref 11.5–14.5)
GLOBULIN SER CALC-MCNC: 3.2 G/DL (ref 2–4)
GLUCOSE SERPL-MCNC: 119 MG/DL (ref 65–100)
GLUCOSE UR STRIP.AUTO-MCNC: NEGATIVE MG/DL
HCT VFR BLD AUTO: 40.7 % (ref 36.6–50.3)
HGB BLD-MCNC: 14.3 G/DL (ref 12.1–17)
HGB UR QL STRIP: NEGATIVE
HYALINE CASTS URNS QL MICRO: ABNORMAL /LPF (ref 0–5)
KETONES UR QL STRIP.AUTO: 40 MG/DL
LACTATE SERPL-SCNC: 1.5 MMOL/L (ref 0.4–2)
LEUKOCYTE ESTERASE UR QL STRIP.AUTO: NEGATIVE
LIPASE SERPL-CCNC: 139 U/L (ref 73–393)
LYMPHOCYTES # BLD: 0.9 K/UL (ref 0.8–3.5)
LYMPHOCYTES NFR BLD: 10 % (ref 12–49)
MCH RBC QN AUTO: 30.7 PG (ref 26–34)
MCHC RBC AUTO-ENTMCNC: 35.1 G/DL (ref 30–36.5)
MCV RBC AUTO: 87.3 FL (ref 80–99)
METHADONE UR QL: NEGATIVE
MONOCYTES # BLD: 0.5 K/UL (ref 0–1)
MONOCYTES NFR BLD: 6 % (ref 5–13)
NEUTS SEG # BLD: 7.8 K/UL (ref 1.8–8)
NEUTS SEG NFR BLD: 84 % (ref 32–75)
NITRITE UR QL STRIP.AUTO: NEGATIVE
OPIATES UR QL: NEGATIVE
PCP UR QL: NEGATIVE
PH UR STRIP: 8 [PH] (ref 5–8)
PLATELET # BLD AUTO: 313 K/UL (ref 150–400)
POTASSIUM SERPL-SCNC: 3.4 MMOL/L (ref 3.5–5.1)
PROT SERPL-MCNC: 7.6 G/DL (ref 6.4–8.2)
PROT UR STRIP-MCNC: NEGATIVE MG/DL
RBC # BLD AUTO: 4.66 M/UL (ref 4.1–5.7)
RBC #/AREA URNS HPF: ABNORMAL /HPF (ref 0–5)
SODIUM SERPL-SCNC: 140 MMOL/L (ref 136–145)
SP GR UR REFRACTOMETRY: 1.02 (ref 1–1.03)
UROBILINOGEN UR QL STRIP.AUTO: 0.2 EU/DL (ref 0.2–1)
WBC # BLD AUTO: 9.2 K/UL (ref 4.1–11.1)
WBC URNS QL MICRO: ABNORMAL /HPF (ref 0–4)

## 2017-09-01 PROCEDURE — 74011636320 HC RX REV CODE- 636/320: Performed by: RADIOLOGY

## 2017-09-01 PROCEDURE — 96366 THER/PROPH/DIAG IV INF ADDON: CPT

## 2017-09-01 PROCEDURE — 80053 COMPREHEN METABOLIC PANEL: CPT | Performed by: EMERGENCY MEDICINE

## 2017-09-01 PROCEDURE — 36415 COLL VENOUS BLD VENIPUNCTURE: CPT | Performed by: EMERGENCY MEDICINE

## 2017-09-01 PROCEDURE — 85025 COMPLETE CBC W/AUTO DIFF WBC: CPT | Performed by: EMERGENCY MEDICINE

## 2017-09-01 PROCEDURE — C9113 INJ PANTOPRAZOLE SODIUM, VIA: HCPCS | Performed by: EMERGENCY MEDICINE

## 2017-09-01 PROCEDURE — 99285 EMERGENCY DEPT VISIT HI MDM: CPT

## 2017-09-01 PROCEDURE — 74177 CT ABD & PELVIS W/CONTRAST: CPT

## 2017-09-01 PROCEDURE — 93005 ELECTROCARDIOGRAM TRACING: CPT

## 2017-09-01 PROCEDURE — 96361 HYDRATE IV INFUSION ADD-ON: CPT

## 2017-09-01 PROCEDURE — 96375 TX/PRO/DX INJ NEW DRUG ADDON: CPT

## 2017-09-01 PROCEDURE — 80307 DRUG TEST PRSMV CHEM ANLYZR: CPT | Performed by: EMERGENCY MEDICINE

## 2017-09-01 PROCEDURE — 99218 HC RM OBSERVATION: CPT

## 2017-09-01 PROCEDURE — 83690 ASSAY OF LIPASE: CPT | Performed by: EMERGENCY MEDICINE

## 2017-09-01 PROCEDURE — 96374 THER/PROPH/DIAG INJ IV PUSH: CPT

## 2017-09-01 PROCEDURE — 81001 URINALYSIS AUTO W/SCOPE: CPT | Performed by: EMERGENCY MEDICINE

## 2017-09-01 PROCEDURE — 83605 ASSAY OF LACTIC ACID: CPT

## 2017-09-01 PROCEDURE — 74011250636 HC RX REV CODE- 250/636: Performed by: EMERGENCY MEDICINE

## 2017-09-01 PROCEDURE — 74011250636 HC RX REV CODE- 250/636: Performed by: FAMILY MEDICINE

## 2017-09-01 PROCEDURE — 96365 THER/PROPH/DIAG IV INF INIT: CPT

## 2017-09-01 RX ORDER — SODIUM CHLORIDE 0.9 % (FLUSH) 0.9 %
5-10 SYRINGE (ML) INJECTION EVERY 8 HOURS
Status: DISCONTINUED | OUTPATIENT
Start: 2017-09-01 | End: 2017-09-02 | Stop reason: HOSPADM

## 2017-09-01 RX ORDER — PANTOPRAZOLE SODIUM 40 MG/10ML
40 INJECTION, POWDER, LYOPHILIZED, FOR SOLUTION INTRAVENOUS
Status: COMPLETED | OUTPATIENT
Start: 2017-09-01 | End: 2017-09-01

## 2017-09-01 RX ORDER — POTASSIUM CHLORIDE 7.45 MG/ML
10 INJECTION INTRAVENOUS
Status: DISPENSED | OUTPATIENT
Start: 2017-09-01 | End: 2017-09-01

## 2017-09-01 RX ORDER — DIPHENHYDRAMINE HYDROCHLORIDE 50 MG/ML
25 INJECTION, SOLUTION INTRAMUSCULAR; INTRAVENOUS
Status: COMPLETED | OUTPATIENT
Start: 2017-09-01 | End: 2017-09-01

## 2017-09-01 RX ORDER — ONDANSETRON 2 MG/ML
4 INJECTION INTRAMUSCULAR; INTRAVENOUS
Status: DISCONTINUED | OUTPATIENT
Start: 2017-09-01 | End: 2017-09-02 | Stop reason: HOSPADM

## 2017-09-01 RX ORDER — FENTANYL CITRATE 50 UG/ML
25 INJECTION, SOLUTION INTRAMUSCULAR; INTRAVENOUS
Status: COMPLETED | OUTPATIENT
Start: 2017-09-01 | End: 2017-09-01

## 2017-09-01 RX ORDER — MORPHINE SULFATE 4 MG/ML
1 INJECTION, SOLUTION INTRAMUSCULAR; INTRAVENOUS
Status: DISCONTINUED | OUTPATIENT
Start: 2017-09-01 | End: 2017-09-02 | Stop reason: HOSPADM

## 2017-09-01 RX ORDER — SODIUM CHLORIDE 9 MG/ML
110 INJECTION, SOLUTION INTRAVENOUS CONTINUOUS
Status: DISCONTINUED | OUTPATIENT
Start: 2017-09-01 | End: 2017-09-02 | Stop reason: HOSPADM

## 2017-09-01 RX ORDER — PROCHLORPERAZINE EDISYLATE 5 MG/ML
10 INJECTION INTRAMUSCULAR; INTRAVENOUS
Status: COMPLETED | OUTPATIENT
Start: 2017-09-01 | End: 2017-09-01

## 2017-09-01 RX ORDER — HEPARIN SODIUM 5000 [USP'U]/ML
5000 INJECTION, SOLUTION INTRAVENOUS; SUBCUTANEOUS EVERY 8 HOURS
Status: DISCONTINUED | OUTPATIENT
Start: 2017-09-01 | End: 2017-09-01

## 2017-09-01 RX ORDER — SODIUM CHLORIDE 0.9 % (FLUSH) 0.9 %
5-10 SYRINGE (ML) INJECTION AS NEEDED
Status: DISCONTINUED | OUTPATIENT
Start: 2017-09-01 | End: 2017-09-02 | Stop reason: HOSPADM

## 2017-09-01 RX ADMIN — PROCHLORPERAZINE EDISYLATE 10 MG: 5 INJECTION INTRAMUSCULAR; INTRAVENOUS at 16:46

## 2017-09-01 RX ADMIN — Medication 10 ML: at 22:00

## 2017-09-01 RX ADMIN — POTASSIUM CHLORIDE 10 MEQ: 10 INJECTION, SOLUTION INTRAVENOUS at 22:40

## 2017-09-01 RX ADMIN — POTASSIUM CHLORIDE 10 MEQ: 10 INJECTION, SOLUTION INTRAVENOUS at 21:01

## 2017-09-01 RX ADMIN — DIPHENHYDRAMINE HYDROCHLORIDE 25 MG: 50 INJECTION, SOLUTION INTRAMUSCULAR; INTRAVENOUS at 16:46

## 2017-09-01 RX ADMIN — SODIUM CHLORIDE 110 ML/HR: 900 INJECTION, SOLUTION INTRAVENOUS at 20:47

## 2017-09-01 RX ADMIN — FENTANYL CITRATE 25 MCG: 50 INJECTION, SOLUTION INTRAMUSCULAR; INTRAVENOUS at 17:40

## 2017-09-01 RX ADMIN — SODIUM CHLORIDE 1000 ML: 900 INJECTION, SOLUTION INTRAVENOUS at 17:45

## 2017-09-01 RX ADMIN — PANTOPRAZOLE SODIUM 40 MG: 40 INJECTION, POWDER, FOR SOLUTION INTRAVENOUS at 17:43

## 2017-09-01 RX ADMIN — SODIUM CHLORIDE 1000 ML: 900 INJECTION, SOLUTION INTRAVENOUS at 16:45

## 2017-09-01 RX ADMIN — IOPAMIDOL 100 ML: 755 INJECTION, SOLUTION INTRAVENOUS at 20:06

## 2017-09-01 RX ADMIN — POTASSIUM CHLORIDE 10 MEQ: 10 INJECTION, SOLUTION INTRAVENOUS at 22:00

## 2017-09-01 NOTE — H&P
[address] [facility] [address] [address]   Office [phone]  Fax [phone]       Admission H&P     Name: Wander Begum MRN: [id number]  Sex: Male   YOB: 1990  Age: 32 y.o. PCP: Lacey Nails MD     Source of Information: patient, medical records    Chief complaint: N/V    History of Present Illness  Wander Begum is a 32 y.o. male with known hx gastric ulcer, heart murmur who presents to the ER complaining of intractable nausea/vomiting with abdominal pain. Patient has had recurrent issues with N/V for several years now, was seen in 1/2017 when colitis was seen on abdominal CT. States he had an EGD done 7-8 years ago that showed his gastric ulcer. Since then he reports occasional episodes of nausea/vomiting. Has never followed up with outpatient GI, only PCP. Most recent episode started 8/29 after eating Exabre. Pt seen in ED on 8/30 for epigastric abdominal pain after eating 1Cast. Was discharged with zofran, phenergan, sucralfate, and omeprazole. States he has been very compliant with his medications and was actually improving after discharge, was able to keep down water, juice, and clear broth yesterday without problem. However ,today patient tried to eat vegetable soup which exacerbated the pain and caused the nausea and vomiting to return. States abdominal pain is epigastric, was 7/10 on arrival, 1/10 at the time of my interview. However, as the interview progressed patient stated his pain was returning. States pain did radiate through to his back, is also having belching and chills. Last BM was this morning, \"a little. \" Denies any hematemesis, diarrhea, bloody stools, chest pain, lower abdominal pain, SOB. At time of interview patient is sleepy but arousable, able to answer questions. Intermittently grimacing in pain. Wife also providing additional information. Denies any recent drug use.      In the ED, vitals remarkable for RR 22, otherwise normal. WBC 9.2, Hgb 14. 3. Hypokalemc at 3.4, sodium 140. Glucose 119. Lipase and lactic acid were wnl. UA showing 40 ketones, negative otherwise. EKG showed NSR with sinus arrythmia, nonspecific T wave abnormality. No previous EKG for comparison. UDS positive for THC. He was given NS 1L bolus, benadryl 25mg IV, fentanyl 25mcg IV, protonix 40mg IV, and compazine 10mg IV. Past Medical History:   Diagnosis Date    Gastric ulcer     due to drug use    Heart murmur of      Hx of drug abuse     Ulcer ([email or web address]       Patient Vitals for the past 12 hrs:   Temp Pulse Resp BP SpO2   17 1900 - - - (!) 137/91 97 %   17 1830 - - - 130/81 96 %   17 1800 - - - 124/77 97 %   17 1745 - - - - 93 %   17 1730 - - - 128/77 100 %   17 1723 - - - 141/77 99 %   17 1612 98.8 °F (37.1 °C) 61 22 140/74 97 %        Home Medications   Prior to Admission medications    Medication Sig Start Date End Date Taking? Authorizing Provider   promethazine (PHENERGAN) 25 mg tablet Take 1 Tab by mouth every six (6) hours as needed. 17   Samir Patel MD   omeprazole (PRILOSEC) 20 mg capsule Take 1 Cap by mouth daily. 17   Samir Patel MD   sucralfate (CARAFATE) 100 mg/mL suspension Take 5 mL by mouth four (4) times daily. 17   Samir Patel MD   ondansetron (ZOFRAN ODT) 4 mg disintegrating tablet Take 1 Tab by mouth every eight (8) hours as needed for Nausea.  17   CARRILLO Borjas       Allergies  No Known Allergies    Past Medical History:   Diagnosis Date    Gastric ulcer     due to drug use    Heart murmur of      Hx of drug abuse     Ulcer ([email or web address] 75[facility]        Past Surgical History:   Procedure Laterality Date    HX OTHER SURGICAL      infant heart murmur       Family History   Problem Relation Age of Onset    Migraines Mother        Social History  Social History     Social History    Marital status:      Spouse name: N/A    Number of children: N/A    Years of education: N/A Occupational History    Not on file. Social History Main Topics    Smoking status: Current Some Day Smoker    Smokeless tobacco: Never Used    Alcohol use No    Drug use: Yes     Special: Marijuana    Sexual activity: Yes     Partners: Female     Other Topics Concern    Not on file     Social History Narrative    ** Merged History Encounter **            Alcohol history: Not at all. Denies any alcohol since his January 2017 visit to the ED  Smoking history: Occasional, nondaily smoker. Illicit drug history: Marijuana use. Does not remember the last time he smoked marijuana, UDS + for THC,    Living arrangement: patient lives with their family. Ambulates: Independently     Review of Systems  Review of Systems   Constitutional: Positive for chills and fatigue. Negative for diaphoresis and fever. Respiratory: Negative for choking, chest tightness, shortness of breath and wheezing. Cardiovascular: Negative for chest pain and palpitations. Gastrointestinal: Positive for abdominal pain, nausea and vomiting. Negative for abdominal distention, blood in stool, constipation and diarrhea. Neurological: Negative for dizziness, light-headedness and headaches. Physical Exam  Objective:  General Appearance:  General patient appearance: Pt sleeping during interview, wakes up to answer questions before closing eyes. Grimmaces in pain intermittently throughout interview. Visibly shaking with chills. Vital signs: (most recent): Blood pressure (!) 137/91, pulse 61, temperature 98.8 °F (37.1 °C), resp. rate 22, height 5' 7\" (1.702 m), weight 157 lb (71.2 kg), SpO2 97 %. No fever. HEENT: Normal HEENT exam.    Lungs:  Normal respiratory rate and normal effort. He is not in respiratory distress. Breath sounds clear to auscultation. Heart: Normal rate. Regular rhythm. Chest: No chest wall tenderness. Neurological: Patient is oriented to person, place and time. Skin:  Warm and dry. Abdomen: Abdomen is soft and non-distended. There are no signs of ascites. Hyperactive bowel sounds. There is epigastric and ladarius-umbilical tenderness. There is no rebound tenderness. There is no guarding. There is no mass. There is no splenomegaly. There is no hepatomegaly. O2 Device: Room air     Laboratory Data  Recent Results (from the past 8 hour(s))   CBC WITH AUTOMATED DIFF    Collection Time: 09/01/17  4:33 PM   Result Value Ref Range    WBC 9.2 4.1 - 11.1 K/uL    RBC 4.66 4.10 - 5.70 M/uL    HGB 14.3 12.1 - 17.0 g/dL    HCT 40.7 36.6 - 50.3 %    MCV 87.3 80.0 - 99.0 FL    MCH 30.7 26.0 - 34.0 PG    MCHC 35.1 30.0 - 36.5 g/dL    RDW 12.4 11.5 - 14.5 %    PLATELET 902 143 - 503 K/uL    NEUTROPHILS 84 (H) 32 - 75 %    LYMPHOCYTES 10 (L) 12 - 49 %    MONOCYTES 6 5 - 13 %    EOSINOPHILS 0 0 - 7 %    BASOPHILS 0 0 - 1 %    ABS. NEUTROPHILS 7.8 1.8 - 8.0 K/UL    ABS. LYMPHOCYTES 0.9 0.8 - 3.5 K/UL    ABS. MONOCYTES 0.5 0.0 - 1.0 K/UL    ABS. EOSINOPHILS 0.0 0.0 - 0.4 K/UL    ABS. BASOPHILS 0.0 0.0 - 0.1 K/UL   METABOLIC PANEL, COMPREHENSIVE    Collection Time: 09/01/17  4:33 PM   Result Value Ref Range    Sodium 140 136 - 145 mmol/L    Potassium 3.4 (L) 3.5 - 5.1 mmol/L    Chloride 102 97 - 108 mmol/L    CO2 28 21 - 32 mmol/L    Anion gap 10 5 - 15 mmol/L    Glucose 119 (H) 65 - 100 mg/dL    BUN 13 6 - 20 MG/DL    Creatinine 1.16 0.70 - 1.30 MG/DL    BUN/Creatinine ratio 11 (L) 12 - 20      GFR est AA >60 >60 ml/min/1.73m2    GFR est non-AA >60 >60 ml/min/1.73m2    Calcium 9.5 8.5 - 10.1 MG/DL    Bilirubin, total 0.7 0.2 - 1.0 MG/DL    ALT (SGPT) 23 12 - 78 U/L    AST (SGOT) 13 (L) 15 - 37 U/L    Alk.  phosphatase 70 45 - 117 U/L    Protein, total 7.6 6.4 - 8.2 g/dL    Albumin 4.4 3.5 - 5.0 g/dL    Globulin 3.2 2.0 - 4.0 g/dL    A-G Ratio 1.4 1.1 - 2.2     LIPASE    Collection Time: 09/01/17  4:33 PM   Result Value Ref Range    Lipase 139 73 - 393 U/L   EKG, 12 LEAD, INITIAL    Collection Time: 09/01/17  5:05 PM   Result Value Ref Range    Ventricular Rate 85 BPM    Atrial Rate 85 BPM    P-R Interval 130 ms    QRS Duration 80 ms    Q-T Interval 364 ms    QTC Calculation (Bezet) 433 ms    Calculated P Axis 55 degrees    Calculated R Axis 7 degrees    Calculated T Axis -8 degrees    Diagnosis       Normal sinus rhythm with sinus arrhythmia  Nonspecific T wave abnormality  Abnormal ECG  No previous ECGs available     DRUG SCREEN, URINE    Collection Time: 09/01/17  5:34 PM   Result Value Ref Range    AMPHETAMINES NEGATIVE  NEG      BARBITURATES NEGATIVE  NEG      BENZODIAZEPINE NEGATIVE  NEG      COCAINE NEGATIVE  NEG      METHADONE NEGATIVE  NEG      OPIATES NEGATIVE  NEG      PCP(PHENCYCLIDINE) NEGATIVE  NEG      THC (TH-CANNABINOL) POSITIVE (A) NEG      Drug screen comment (NOTE)    URINALYSIS W/MICROSCOPIC    Collection Time: 09/01/17  5:34 PM   Result Value Ref Range    Color YELLOW/STRAW      Appearance CLEAR CLEAR      Specific gravity 1.025 1.003 - 1.030      pH (UA) 8.0 5.0 - 8.0      Protein NEGATIVE  NEG mg/dL    Glucose NEGATIVE  NEG mg/dL    Ketone 40 (A) NEG mg/dL    Bilirubin NEGATIVE  NEG      Blood NEGATIVE  NEG      Urobilinogen 0.2 0.2 - 1.0 EU/dL    Nitrites NEGATIVE  NEG      Leukocyte Esterase NEGATIVE  NEG      WBC 0-4 0 - 4 /hpf    RBC 0-5 0 - 5 /hpf    Epithelial cells FEW FEW /lpf    Bacteria NEGATIVE  NEG /hpf    Hyaline cast 0-2 0 - 5 /lpf       Imaging  CXR Results  (Last 48 hours)    None        CT Results  (Last 48 hours)    None            Assessment and Plan     Yojana Sosa is a 32 y.o. male who is admitted for intractable nausea/vomiting and abdominal pain in the setting of hx of gastric ulcer/PUD. Intractable Nausea/Vomiting - likely due to his hx of ulcers and no chronic medications, although he has been compliant with his medications for the past 2 days. Possible worsening PUD, gastric ulcers, gastritis, colitis, or obstructive process.  Could also have food poisoning or viral GE but less likely. Lactic acid and lipase were wnl.   - GI consult ordered for tomorrow. May need outpatient EGD.   - NPO with NS @ 110cc/hr  - CT Abd/Pelvix w and without contrast  - Scheduled IV protonix, PRN zofran, morphine 1mg q4h  - Pending: gastric occult blood. Hypokalemia - 2/2 vomiting  - Being repleted. Continue to monitor. FEN/GI - NPO. NS at 110 mL/hr. Activity - Ambulate as tolerated  DVT prophylaxis - SCDs  GI prophylaxis - Protonix  Fall prophylaxis - Not indicated at this time. Disposition - Admit to Medical. Plan to d/c to Home. Code Status - Full, discussed with patient / caregivers. Next of Kin Name and Contact:  Nancy Banerjee, [phone]    Patient Coreen Age will be discussed Dr. Donna Thompson.     7:28 PM, 09/01/17  Nahun Bradley MD  Family Medicine Resident       For Billing    Chief Complaint   Patient presents with    Nausea    Vomiting    Abdominal Pain       Hospital Problems  Date Reviewed: 9/1/2017          Codes Class Noted POA    Intractable nausea and vomiting ICD-10-CM: R11.2  ICD-9-CM: 536.2  9/1/2017 Unknown

## 2017-09-01 NOTE — IP AVS SNAPSHOT
[address] [id number] Patient: Brandon Royal MRN: [id number] GJN:[id number] Current Discharge Medication List  
  
CONTINUE these medications which have NOT CHANGED Dose & Instructions Dispensing Information Comments Morning Noon Evening Bedtime  
 omeprazole 20 mg capsule Commonly known as:  PRILOSEC Your last dose was: Your next dose is:    
   
   
 Dose:  20 mg Take 1 Cap by mouth daily. Quantity:  30 Cap Refills:  0  
     
   
   
   
  
 ondansetron 4 mg disintegrating tablet Commonly known as:  ZOFRAN ODT Your last dose was: Your next dose is:    
   
   
 Dose:  4 mg Take 1 Tab by mouth every eight (8) hours as needed for Nausea. Quantity:  20 Tab Refills:  0  
     
   
   
   
  
 promethazine 25 mg tablet Commonly known as:  PHENERGAN Your last dose was: Your next dose is:    
   
   
 Dose:  25 mg Take 1 Tab by mouth every six (6) hours as needed. Quantity:  12 Tab Refills:  0  
     
   
   
   
  
 sucralfate 100 mg/mL suspension Commonly known as:  Juliann Constant Your last dose was: Your next dose is:    
   
   
 Dose:  1 tsp Take 5 mL by mouth four (4) times daily. Quantity:  414 mL Refills:  0 Where to Get Your Medications Information on where to get these meds will be given to you by the nurse or doctor. ! Ask your nurse or doctor about these medications  
  omeprazole 20 mg capsule  
 ondansetron 4 mg disintegrating tablet

## 2017-09-01 NOTE — PROGRESS NOTES
[address] [facility]   Senior Resident Admission Note    CC: \"Vomiting and nausea\"    HPI:  Venus Mauricio is a 32 y.o. male with known peptic ulcer disease, colitis who presents to the ER complaining of intractable nausea and vomiting accompanied by epigastric pain since this aftrenoon. The pt was seen in the [facility] ER on 8/30 and was discharged on PPI, sucralfate and zofran. He felt better yesterday and was able to tolerate clear liquids but today eat some vegetable soup and started to vomit again so decided to come to the ER. No fever, hematemesis, melena, hematochezia, diarrhea, chest pain, SOB. Denies alcohol intake. Denies hematemesis, diarrhea, fever, chest pain, SOB. Chart was reviewed. The pt was presviously admitted to our service for the colitis. In the ER he was given: IV Fentanyl 25mg, Protonix 40 mg, Compazine 10 mg, Benadryl 25 mg IV and NS 1L x 2. After the treatment the pain subsided and the pt felt better. Visit Vitals    /76    Pulse 61    Temp 98.8 °F (37.1 °C)    Resp 22    Ht 5' 7\" (1.702 m)    Wt 157 lb (71.2 kg)    SpO2 95%    BMI 24.59 kg/m2       Gen -Sleeping comfortably in the bed but awaked when asked, looks tired. Alert @ oriented x3. CV - RR, nl HR, no m/r/g  Lungs - CTAB  Abd - + BS, soft, tender to moderate palpation of the epigastric region and mild tenderness in upper quadrants, neg alejanrde's sign and no ttp at Mcburney's pt, no guarding or rebound. Extrem - no edema, ttp     A/P: Symptoms likely d/t PUD with acute exacerbation. 1. Intractable nausea and vomiting in the setting of PUD. Pt with hx of colitis. LA 1. 5. Diagnosed by GI many years ago, had EGD done approx 7 years ago. He supposed to be on PPIs prescribed by GI a long time ago, but he refused to take it before.   -Admit to medical floor  -IV Zofran, Protonix IV, pain control with IV morphine 1 mg.   -NPO  -MIVF with NS  -Will recheck LA in 6 hours  -Will get Ct abdomen to rule out other intraabdominal process  -GI consulted. The pt most likely needs EGD. 2. Hypokalemia. K 3.4 on admission. Most likely from multiple episodes of emesis. -IV repletion as he cannot tolerate PO  -Daily BMP      Patient seen, examined, and discussed with Dr. Db Reyna (PGY-1). For the remaining assessment and plan of other medical problems please refer to Dr. Tonnie Hodgkins H&P for more details. Pt discussed with on-call attending physician ( Dr. Nina Deal).     Rober Angelo MD  Family Medicine Resident

## 2017-09-01 NOTE — ED NOTES
TRANSFER - OUT REPORT:    Verbal report given to 5th floor on Ranulfo [place]  being transferred to for routine progression of care       Report consisted of patients Situation, Background, Assessment and   Recommendations(SBAR). Information from the following report(s) SBAR, ED Summary and MAR was reviewed with the receiving nurse. Lines:   Peripheral IV 09/01/17 Right Antecubital (Active)   Site Assessment Clean, dry, & intact 9/1/2017  4:54 PM   Phlebitis Assessment 0 9/1/2017  4:54 PM   Infiltration Assessment 0 9/1/2017  4:54 PM   Dressing Status Clean, dry, & intact;New;Occlusive 9/1/2017  4:54 PM   Dressing Type Tape;Transparent 9/1/2017  4:54 PM   Hub Color/Line Status Green; Infusing;Capped;Flushed;Patent 9/1/2017  4:54 PM   Alcohol Cap Used Yes 9/1/2017  4:54 PM        Opportunity for questions and clarification was provided.       Patient transported with:   [employer]

## 2017-09-01 NOTE — ED PROVIDER NOTES
HPI Comments: 32 y.o. male with past medical history significant for ulcer, heart murmur, and gastric ulcer who presents from home with chief complaint of vomiting. Pt reports constant nausea and vomiting which began 3 days ago accompanied by 7/10 epigastric abdominal pain and belching. Pt's wife states he was seen here for these sx 3 days ago and was discharged w/ Prilosec, antinausea medication, and carafate. She notes he has been unable to keep down any of his medications since then. Per Pt's wife, Pt's sx seemed to be improving until he ate soup and took his nausea medication 4-5 hours ago. Pt states his most recent BM was \"a little\" this morning. Pt denies previous hx of abdominal surgery. Pt denies hematemesis, diarrhea, fever, chest pain, and SOB. There are no other acute medical concerns at this time. Social Hx: occasional cigarette smoker, denies EtOH and illicit drug use. Old Chart Review: Pt was seen here yesterday. No imaging was taken other than an XR since his abdominal exam was unremarkable at that time. Pt was admitted in 2017 for intractable nausea and vomiting. He was placed on PPI's and nausea medications and discharged with GI follow up. PCP: Zayra Allan MD    Note written by Lima Vora, as dictated by Mervin Clement MD 4:37 PM    The history is provided by the patient and the spouse. Past Medical History:   Diagnosis Date    Gastric ulcer     due to drug use    Heart murmur of      Hx of drug abuse     [facility]cer ([facility]        Past Surgical History:   Procedure Laterality Date    HX OTHER SURGICAL      infant heart murmur         Family History:   Problem Relation Age of Onset    Migraines Mother        Social History     Social History    Marital status:      Spouse name: N/A    Number of children: N/A    Years of education: N/A     Occupational History    Not on file.      Social History Main Topics    Smoking status: Current Some Day Smoker    Smokeless tobacco: Never Used    Alcohol use No    Drug use: Yes     Special: Marijuana    Sexual activity: Yes     Partners: Female     Other Topics Concern    Not on file     Social History Narrative    ** Merged History Encounter **              ALLERGIES: Review of patient's allergies indicates no known allergies. Review of Systems   Constitutional: Negative for fever. Respiratory: Negative for shortness of breath. Cardiovascular: Negative for chest pain. Gastrointestinal: Positive for abdominal pain, nausea and vomiting. Negative for diarrhea. All other systems reviewed and are negative. Vitals:    09/01/17 1612   BP: 140/74   Pulse: 61   Resp: 22   Temp: 98.8 °F (37.1 °C)   SpO2: 97%   Weight: 71.2 kg (157 lb)   Height: 5' 7\" (1.702 m)            Physical Exam   Constitutional: He is oriented to person, place, and time. He appears well-developed and well-nourished. No distress. Actively vomiting in triage    HENT:   Head: Normocephalic and atraumatic. Eyes: Conjunctivae are normal.   Neck: Normal range of motion. Cardiovascular: Normal rate, regular rhythm, normal heart sounds and intact distal pulses. Exam reveals no friction rub. No murmur heard. Pulmonary/Chest: Effort normal and breath sounds normal. No respiratory distress. He has no wheezes. He has no rales. He exhibits no tenderness. Abdominal: Soft. Bowel sounds are normal. He exhibits no distension. There is tenderness. There is no rebound and no guarding. epigastric   Musculoskeletal: Normal range of motion. Neurological: He is alert and oriented to person, place, and time. Coordination normal.   Skin: Skin is warm. He is diaphoretic. No pallor. Psychiatric: He has a normal mood and affect. His behavior is normal.   Nursing note and vitals reviewed.        MDM  Number of Diagnoses or Management Options  Gastritis and duodenitis:   Non-intractable vomiting with nausea, unspecified vomiting type:   Diagnosis management comments: Pt with persistent n/v. Suspect gastritis vs ulcer. No lower abd ttp no reason to suspect colitis. Repeat wbc count. Could also be hyperemesis from thc though pt denies using to me. Regardless he has been unable to keep anything down and continues to vomit with antiemetics       Amount and/or Complexity of Data Reviewed  Clinical lab tests: ordered and reviewed  Review and summarize past medical records: yes  Discuss the patient with other providers: yes (Family practice)  Independent visualization of images, tracings, or specimens: yes (ekg)    Patient Progress  Patient progress: stable    ED Course       Procedures     EKG interpretation: (Preliminary)  Rhythm: normal sinus rhythm; and regular . Rate (approx.): 85; Axis: normal; P wave: normal; QRS interval: normal ; ST/T wave: non-specific changes    PROGRESS NOTE:  5:33 PM  Obtained urine specimen. Will give pain medication. CONSULT NOTE:  5:33 PM Akshat Ledbetter MD Consult for Family [facility]. Discussed available diagnostic tests and clinical findings. They are in agreement with care plans as outlined. Family Medicine will admit Pt.

## 2017-09-01 NOTE — IP AVS SNAPSHOT
[address] [facility] 
 
 
 [address] [facility] [address] [address] [place] [address] 
[phone] Patient: Karla Arriaga MRN: [id number] COB:[id number] You are allergic to the following No active allergies Recent Documentation Height Weight BMI Smoking Status 1.702 m 72.9 kg 25.17 kg/m2 Current Some Day Smoker Unresulted Labs Order Current Status SAMPLE TO BLOOD BANK In process Emergency Contacts Name Discharge Info Relation Home Work Mobile [address] CAREGIVER [3] Spouse [3]   [phone] About your hospitalization You were admitted on:  September 1, 2017 You last received care in the:  [facility]  MED SURG 2 You were discharged on:  September 2, 2017 Unit phone number:  [phone] Why you were hospitalized Your primary diagnosis was:  Not on File Your diagnoses also included:  Intractable Nausea And Vomiting Providers Seen During Your Hospitalizations Provider Role Specialty Primary office phone Sukumar Oakley MD Attending Provider Emergency Medicine [phone] Hailee Laurent MD Attending Provider Family Practice [phone] Your Primary Care Physician (PCP) Primary Care Physician Office Phone Office Fax Gela Olivia [phone] Follow-up Information Follow up With Details Comments Contact Info Bard Kellie MD Go on 9/6/2017 For followup, appointment is at 2:20 pm.  [address] [address] [phone] Your Appointments Wednesday September 06, 2017  2:20 PM EDT  
ESTABLISHED PATIENT with Bard Kellie MD  
[address] [facility] ([address]) [address]  [address] [address] [phone] Current Discharge Medication List  
  
CONTINUE these medications which have NOT CHANGED Dose & Instructions Dispensing Information Comments Morning Noon Evening Bedtime  
 omeprazole 20 mg capsule Commonly known as:  PRILOSEC Your last dose was: Your next dose is:    
   
   
 Dose:  20 mg Take 1 Cap by mouth daily. Quantity:  30 Cap Refills:  0  
     
   
   
   
  
 ondansetron 4 mg disintegrating tablet Commonly known as:  ZOFRAN ODT Your last dose was: Your next dose is:    
   
   
 Dose:  4 mg Take 1 Tab by mouth every eight (8) hours as needed for Nausea. Quantity:  20 Tab Refills:  0  
     
   
   
   
  
 promethazine 25 mg tablet Commonly known as:  PHENERGAN Your last dose was: Your next dose is:    
   
   
 Dose:  25 mg Take 1 Tab by mouth every six (6) hours as needed. Quantity:  12 Tab Refills:  0  
     
   
   
   
  
 sucralfate 100 mg/mL suspension Commonly known as:  Sagrario Sers Your last dose was: Your next dose is:    
   
   
 Dose:  1 tsp Take 5 mL by mouth four (4) times daily. Quantity:  414 mL Refills:  0 Where to Get Your Medications Information on where to get these meds will be given to you by the nurse or doctor. ! Ask your nurse or doctor about these medications  
  omeprazole 20 mg capsule  
 ondansetron 4 mg disintegrating tablet Discharge Instructions HOME DISCHARGE INSTRUCTIONS Len Gutierrez / [id number] : 1990 Admission date: 2017 Discharge date: 2017 Please bring this form with you to show your care provider at your follow-up appointment. Primary care provider:  Leena Finn MD 
 
Discharging provider:  Mani Todd MD  - Family Medicine Resident Swapnil Thompson MD - Attending, Family Medicine You have been admitted to the hospital with the following diagnoses: 
 
ACUTE DIAGNOSES: 
· Intractable nausea and vomiting . . . . . . . . . . . . . . . . . . . . . . . . . . . . . . . . . . . . . . . . . . . . . . . . . . . . . . . . . . . . . . . . . . . . . . . [place] FOLLOW-UP CARE RECOMMENDATIONS: 
 
Medication changes: None Appointments: Listed on page 2 of these documents. Follow-up tests needed:  
Please follow up with GI outpatient as directed by them. Pending test results: At the time of your discharge the following test results are still pending: None. Please make sure you review these results with your outpatient follow-up provider(s). Specific symptoms to watch for: chest pain, shortness of breath, fever, chills, nausea, vomiting, diarrhea, change in mentation, falling, weakness, bleeding. DIET/what to eat: Regular diet avoiding meats, chocolate, caffeine, and any type of smoking. ACTIVITY:  Activity as tolerated Wound care: None Equipment needed:  None What to do if new or unexpected symptoms occur? If you experience any of the above symptoms (or should other concerns or questions arise after discharge) please call your primary care physician. Return to the emergency room if you cannot get hold of your doctor. · It is very important that you keep your follow-up appointment(s). · Please bring discharge papers, medication list (and/or medication bottles) to your follow-up appointments for review by your outpatient provider(s). · Please check the list of medications and be sure it includes every medication (even non-prescription medications) that your provider wants you to take. · It is important that you take the medication exactly as they are prescribed. · Keep your medication in the bottles provided by the pharmacist and keep a list of the medication names, dosages, and times to be taken in your wallet. · Do not take other medications without consulting your doctor.   
· If you have any questions about your medications or other instructions, please talk to your nurse or care provider before you leave the hospital.  
 
Information obtained by:  
 
I understand that if any problems occur once I am at home I am to contact my physician. These instructions were explained to me and I had the opportunity to ask questions. I understand and acknowledge receipt of the instructions indicated above. Physician's or R.N.'s Signature                                                                  Date/Time Patient or Representative Signature                                                          Date/Time Nausea and Vomiting: Care Instructions Your Care Instructions When you are nauseated, you may feel weak and sweaty and notice a lot of saliva in your mouth. Nausea often leads to vomiting. Most of the time you do not need to worry about nausea and vomiting, but they can be signs of other illnesses. Two common causes of nausea and vomiting are stomach flu and food poisoning. Nausea and vomiting from viral stomach flu will usually start to improve within 24 hours. Nausea and vomiting from food poisoning may last from 12 to 48 hours. The doctor has checked you carefully, but problems can develop later. If you notice any problems or new symptoms, get medical treatment right away. Follow-up care is a key part of your treatment and safety. Be sure to make and go to all appointments, and call your doctor if you are having problems. It's also a good idea to know your test results and keep a list of the medicines you take. How can you care for yourself at home?  
· To prevent dehydration, drink plenty of fluids, enough so that your urine is light yellow or clear like water. Choose water and other caffeine-free clear liquids until you feel better. If you have kidney, heart, or liver disease and have to limit fluids, talk with your doctor before you increase the amount of fluids you drink. · Rest in bed until you feel better. · When you are able to eat, try clear soups, mild foods, and liquids until all symptoms are gone for 12 to 48 hours. Other good choices include dry toast, crackers, cooked cereal, and gelatin dessert, such as Jell-O. When should you call for help? Call 911 anytime you think you may need emergency care. For example, call if: 
· You passed out (lost consciousness). Call your doctor now or seek immediate medical care if: 
· You have symptoms of dehydration, such as: ¨ Dry eyes and a dry mouth. ¨ Passing only a little dark urine. ¨ Feeling thirstier than usual. 
· You have new or worsening belly pain. · You have a new or higher fever. · You vomit blood or what looks like coffee grounds. Watch closely for changes in your health, and be sure to contact your doctor if: 
· You have ongoing nausea and vomiting. · Your vomiting is getting worse. · Your vomiting lasts longer than 2 days. · You are not getting better as expected. Where can you learn more? Go to http://isaac-zoraida.info/. Enter 25 428118 in the search box to learn more about \"Nausea and Vomiting: Care Instructions. \" Current as of: March 20, 2017 Content Version: 11.3 © 3056-1492 "[employer]. Care instructions adapted under license by [employer] (which disclaims liability or warranty for this information). If you have questions about a medical condition or this instruction, always ask your healthcare professional. [place] [address] any warranty or liability for your use of this information. Peptic Ulcer Disease: Care Instructions Your Care Instructions Peptic ulcers are sores on the inside of the stomach or the small intestine. They are usually caused by an infection with bacteria or from use of nonsteroidal anti-inflammatory drugs (NSAIDs). NSAIDs include aspirin, ibuprofen (Advil), and naproxen (Aleve). Your doctor may have prescribed medicine to reduce stomach acid. You also may need to take antibiotics if your peptic ulcers are caused by an infection. You can help your stomach heal and keep ulcers from coming back by making some changes in your lifestyle. Quit smoking, limit caffeine and alcohol, and reduce stress. Follow-up care is a key part of your treatment and safety. Be sure to make and go to all appointments, and call your doctor if you are having problems. Its also a good idea to know your test results and keep a list of the medicines you take. How can you care for yourself at home? · Take your medicines exactly as prescribed. Call your doctor if you think you are having a problem with your medicine. · Do not take aspirin or other NSAIDs such as ibuprofen (Advil or Motrin) or naproxen (Aleve). Ask your doctor what you can take for pain. · Do not smoke. Smoking can make ulcers worse. If you need help quitting, talk to your doctor about stop-smoking programs and medicines. These can increase your chances of quitting for good. · Drink in moderation or avoid drinking alcohol. · Do not drink beverages that have caffeine if they bother your stomach. These include coffee, tea, and soda. · Eat a balanced diet of small, frequent meals. Make an appointment with a dietitian if you need help planning your meals. · Reduce stress. Avoid people and places that make you feel anxious, if you can. Learn ways to reduce stress, such as biofeedback, guided imagery, and meditation. When should you call for help? Call 911 anytime you think you may need emergency care. For example, call if: 
· You passed out (lost consciousness). · You vomit blood or what looks like coffee grounds. · You pass maroon or very bloody stools. Call your doctor now or seek immediate medical care if: 
· You have severe pain in your belly, back, or shoulders. · You have new or worsening belly pain. · You are dizzy or lightheaded, or you feel like you may faint. · Your stools are black and tarlike or have streaks of blood. Watch closely for changes in your health, and be sure to contact your doctor if: 
· You have new symptoms such as weight loss, nausea or vomiting. · You do not feel better as expected. Where can you learn more? Go to http://isaac-zoraida.info/. Enter O402 in the search box to learn more about \"Peptic Ulcer Disease: Care Instructions. \" Current as of: August 9, 2016 Content Version: 11.3 © 6606-2907 [employer]. Care instructions adapted under license by [employer] (which disclaims liability or warranty for this information). If you have questions about a medical condition or this instruction, always ask your healthcare professional. [address] any warranty or liability for your use of this information. Discharge Orders None [facility] Announcement We are excited to announce that we are making your provider's discharge notes available to you in [facility]. You will see these notes when they are completed and signed by the physician that discharged you from your recent hospital stay. If you have any questions or concerns about any information you see in [facility], please call the Health Information Department where you were seen or reach out to your Primary Care Provider for more information about your plan of care. Introducing hospitals & HEALTH SERVICES! Dear Levi Swift: Thank you for requesting a [facility] account. Our records indicate that you already have an active [facility] account. You can access your account anytime at [email or web address] Did you know that you can access your hospital and ER discharge instructions at any time in [employer]? You can also review all of your test results from your hospital stay or ER visit. Additional Information If you have questions, please visit the Frequently Asked Questions section of the [employer] website at [email or web address] Remember, [email or web address] is NOT to be used for urgent needs. For medical emergencies, dial 911. Now available from your iPhone and Android! General Information Please provide this summary of care documentation to your next provider. Patient Signature:  ____________________________________________________________ Date:  ____________________________________________________________  
  
[facility] Provider Signature:  ____________________________________________________________ Date:  ____________________________________________________________

## 2017-09-02 VITALS
OXYGEN SATURATION: 99 % | RESPIRATION RATE: 16 BRPM | HEART RATE: 61 BPM | HEIGHT: 67 IN | BODY MASS INDEX: 25.22 KG/M2 | TEMPERATURE: 98.4 F | SYSTOLIC BLOOD PRESSURE: 123 MMHG | DIASTOLIC BLOOD PRESSURE: 81 MMHG | WEIGHT: 160.72 LBS

## 2017-09-02 LAB
ANION GAP SERPL CALC-SCNC: 10 MMOL/L (ref 5–15)
BUN SERPL-MCNC: 9 MG/DL (ref 6–20)
BUN/CREAT SERPL: 9 (ref 12–20)
CALCIUM SERPL-MCNC: 9 MG/DL (ref 8.5–10.1)
CHLORIDE SERPL-SCNC: 103 MMOL/L (ref 97–108)
CO2 SERPL-SCNC: 27 MMOL/L (ref 21–32)
CREAT SERPL-MCNC: 1.02 MG/DL (ref 0.7–1.3)
ERYTHROCYTE [DISTWIDTH] IN BLOOD BY AUTOMATED COUNT: 12.3 % (ref 11.5–14.5)
GLUCOSE SERPL-MCNC: 92 MG/DL (ref 65–100)
HCT VFR BLD AUTO: 39.1 % (ref 36.6–50.3)
HGB BLD-MCNC: 13.9 G/DL (ref 12.1–17)
LACTATE SERPL-SCNC: 1.1 MMOL/L (ref 0.4–2)
MCH RBC QN AUTO: 30.9 PG (ref 26–34)
MCHC RBC AUTO-ENTMCNC: 35.5 G/DL (ref 30–36.5)
MCV RBC AUTO: 86.9 FL (ref 80–99)
PLATELET # BLD AUTO: 312 K/UL (ref 150–400)
POTASSIUM SERPL-SCNC: 3.6 MMOL/L (ref 3.5–5.1)
RBC # BLD AUTO: 4.5 M/UL (ref 4.1–5.7)
SODIUM SERPL-SCNC: 140 MMOL/L (ref 136–145)
WBC # BLD AUTO: 9.5 K/UL (ref 4.1–11.1)

## 2017-09-02 PROCEDURE — 85027 COMPLETE CBC AUTOMATED: CPT | Performed by: FAMILY MEDICINE

## 2017-09-02 PROCEDURE — 96376 TX/PRO/DX INJ SAME DRUG ADON: CPT

## 2017-09-02 PROCEDURE — C9113 INJ PANTOPRAZOLE SODIUM, VIA: HCPCS | Performed by: FAMILY MEDICINE

## 2017-09-02 PROCEDURE — 80048 BASIC METABOLIC PNL TOTAL CA: CPT | Performed by: FAMILY MEDICINE

## 2017-09-02 PROCEDURE — 83605 ASSAY OF LACTIC ACID: CPT | Performed by: FAMILY MEDICINE

## 2017-09-02 PROCEDURE — 36415 COLL VENOUS BLD VENIPUNCTURE: CPT | Performed by: FAMILY MEDICINE

## 2017-09-02 PROCEDURE — 99218 HC RM OBSERVATION: CPT

## 2017-09-02 PROCEDURE — 74011250636 HC RX REV CODE- 250/636: Performed by: FAMILY MEDICINE

## 2017-09-02 PROCEDURE — 74011000250 HC RX REV CODE- 250: Performed by: FAMILY MEDICINE

## 2017-09-02 RX ORDER — OMEPRAZOLE 20 MG/1
20 CAPSULE, DELAYED RELEASE ORAL DAILY
Qty: 30 CAP | Refills: 0 | Status: SHIPPED | OUTPATIENT
Start: 2017-09-02 | End: 2020-01-16

## 2017-09-02 RX ORDER — ONDANSETRON 4 MG/1
4 TABLET, ORALLY DISINTEGRATING ORAL
Qty: 20 TAB | Refills: 0 | Status: SHIPPED | OUTPATIENT
Start: 2017-09-02 | End: 2018-01-16

## 2017-09-02 RX ADMIN — Medication 10 ML: at 06:00

## 2017-09-02 RX ADMIN — SODIUM CHLORIDE 40 MG: 9 INJECTION, SOLUTION INTRAMUSCULAR; INTRAVENOUS; SUBCUTANEOUS at 12:06

## 2017-09-02 NOTE — PROGRESS NOTES
I have reviewed discharge instructions with the patient. The patient verbalized understanding. Patient left unit via wheelchair accompanied by volunteer and spouse.

## 2017-09-02 NOTE — PROGRESS NOTES
Bedside and Verbal shift change report given to Christa Mora (oncoming nurse) by Evie Lepe (offgoing nurse). Report included the following information SBAR, Kardex, ED Summary and MAR.

## 2017-09-02 NOTE — CONSULTS
He is being discharged which I agree with. 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 I suggest 2 months PPI, no THC  He agrees to this plan. He can be discharged. I gave him my card if he needs follow up he will call me.   Peggy Nicholas MD

## 2017-09-02 NOTE — PROGRESS NOTES
Primary Nurse Mitchell Rod RN and Lenore Vaca RN performed a dual skin assessment on this patient No impairment noted  Wilder score is 19

## 2017-09-02 NOTE — PROGRESS NOTES
[facility]: MED RECONCILIATION    Information obtained from: Patient, RxQuery      Allergies: Review of patient's allergies indicates no known allergies. Prior to Admission Medications:     Medication Documentation Review Audit       Reviewed by Katie James (Pharmacist) on 09/01/17 at 2001         Medication Sig Documenting Provider Last Dose Status Taking?      omeprazole (PRILOSEC) 20 mg capsule Take 1 Cap by mouth daily. Claude Castilla, MD 9/1/2017 AM Active Yes    ondansetron (ZOFRAN ODT) 4 mg disintegrating tablet Take 1 Tab by mouth every eight (8) hours as needed for Nausea. CARRILLO Huertas 8/25/2017 Unknown time Active Yes    promethazine (PHENERGAN) 25 mg tablet Take 1 Tab by mouth every six (6) hours as needed. Claude Castilla, MD 9/1/2017 AM Active Yes    sucralfate (CARAFATE) 100 mg/mL suspension Take 5 mL by mouth four (4) times daily. Claude Castilla, MD 9/1/2017 AM Active Yes                    Thank you,  Jarred Mares, Pharm. D.

## 2017-09-02 NOTE — DISCHARGE SUMMARY
[facility] [facility] [address] [place] [address]   Office [phone]  Fax [phone]       Discharge / Transfer / Off-Service Note     Name: Ash Mendez MRN: [id number]  Sex: Male   YOB: 1990  Age: 32 y.o. PCP: Dara Hightower MD     Date of admission: 9/1/2017  Date of discharge/transfer: 9/2/2017    Attending physician at admission: Dr. Romy Ayala  Attending physician at discharge/transfer: Dr. Romy Ayala  Resident physician at discharge/transfer: Shekhar Condon MD     Consultants during hospitalization  Gastroenterology. Admission diagnoses   Intractable nausea and vomiting    Recommended follow-up after discharge  · GI      History of Present Illness  Per admitting provider, Lesly Chang is a 32 y.o. male with known hx gastric ulcer, heart murmur who presents to the ER complaining of intractable nausea/vomiting with abdominal pain.      Patient has had recurrent issues with N/V for several years now, was seen in 1/2017 when colitis was seen on abdominal CT. States he had an EGD done 7-8 years ago that showed his gastric ulcer. Since then he reports occasional episodes of nausea/vomiting. Has never followed up with outpatient GI, only PCP. Most recent episode started 8/29 after eating [place]. Pt seen in ED on 8/30 for epigastric abdominal pain after eating [employer] lian. Was discharged with zofran, phenergan, sucralfate, and omeprazole. States he has been very compliant with his medications and was actually improving after discharge, was able to keep down water, juice, and clear broth yesterday without problem. However ,today patient tried to eat vegetable soup which exacerbated the pain and caused the nausea and vomiting to return. States abdominal pain is epigastric, was 7/10 on arrival, 1/10 at the time of my interview. However, as the interview progressed patient stated his pain was returning.  States pain did radiate through to his back, is also having belching and chills. Last BM was this morning, \"a little. \" Denies any hematemesis, diarrhea, bloody stools, chest pain, lower abdominal pain, SOB. At time of interview patient is sleepy but arousable, able to answer questions. Intermittently grimacing in pain. Wife also providing additional information. Denies any recent drug use.      In the ED, vitals remarkable for RR 22, otherwise normal. WBC 9.2, Hgb 14. 3. Hypokalemc at 3.4, sodium 140. Glucose 119. Lipase and lactic acid were wnl. UA showing 40 ketones, negative otherwise. EKG showed NSR with sinus arrythmia, nonspecific T wave abnormality. No previous EKG for comparison. UDS positive for THC. He was given NS 1L bolus, benadryl 25mg IV, fentanyl 25mcg IV, protonix 40mg IV, and compazine 10mg IV. \"    HOSPITAL COURSE    Intractable Nausea/Vomiting - likely due to hx of ulcers and no chronic medications between his diagnosis in Jan 2017 and recently, although he has been compliant with his medications for the past 2 days since ED visit. POA Lactic acid and lipase were wnl, no leukocytosis, no fever. CT Abd/Pelvis was negative for any acute abdominal pr pelvic abnormality, and repeat lactic acid was also wnl. Patient was placed on NPO with NS maintenance dose overnight. Did not require any PRN medications overnight, did not have any vomiting overnight. GI consulted, stated patient's vomiting likely due to cannabinoid hyperemesis syndrome and recommended that patient take PPI for 2 months and stop THC use. Patient given script for 20mg omeprazole daily. Patient will follow up with Dr. Meli Villa as needed. Hypokalemia POA - 2/2 vomiting. S/p repletion with IV potassium, resolved on BMP on 9/2. Elevated Cr - 1.16 POA, improved to 1.02 after NS maintenance dose. Cr likely increased due to hypovolemia, resolving/improving with IVF.  Difficult to assess baseline as every time patient has been here he was also hypovolemic and dehydrated with the same symptoms, therefore unable to dx as JOSHUA. Physical exam at discharge:    Vitals Reviewed. General Oriented to person, place, and time and well-developed. Appears well-nourished, no distress. Not diaphoretic. HENT Head Normocephalic and atraumatic. Cardio Normal rate, regular rhythm. Exam reveals no gallop and no friction rub. No murmur heard. No chest wall tenderness. Pulmonary Effort normal and breath sounds normal. No respiratory distress. No wheezes, no rales. Abdominal Soft. Bowel sounds normal. No distension. No tenderness.  Deferred. Extremities No edema of lower extremities. No tenderness. Distal pulses intact. Neurological Alert and oriented to person, place, and time. Dermatology Skin is warm and dry. No rash noted. No erythema or pallor. Psychiatric Affect and judgment normal.        Condition at discharge: Stable. Labs  Recent Labs      09/02/17   0200  09/01/17   1633   WBC  9.5  9.2   HGB  13.9  14.3   HCT  39.1  40.7   PLT  312  313     Recent Labs      09/02/17   0200  09/01/17   1633   NA  140  140   K  3.6  3.4*   CL  103  102   CO2  27  28   BUN  9  13   CREA  1.02  1.16   GLU  92  119*   CA  9.0  9.5     Recent Labs      09/01/17   1633   SGOT  13*   ALT  23   AP  70   TBILI  0.7   TP  7.6   ALB  4.4   GLOB  3.2   LPSE  139     No results for input(s): PH, PCO2, PO2, TNIPOC, TROIQ, INR, PTP, APTT, FE, TIBC, PSAT, FERR, GLUCPOC in the last 72 hours. No lab exists for component: GLPOC, INREXT, INREXT  Cultures  · None    Procedures / Diagnostic Studies    Imaging    Results from Hospital Encounter encounter on 08/30/17   XR ABD ACUTE W 1 V CHEST   Narrative Clinical indication: Abdominal pain. AP upright view of the chest, supine and upright views of the abdomen obtained. There is no free air. The gas pattern is nonspecific. Impression IMPRESSION: Nonspecific gas pattern. No results found for this or any previous visit.            Results from [employer] Encounter encounter on 09/01/17   CT ABD PELV W CONT   Narrative EXAM:  CT ABD PELV W CONT    INDICATION: Nausea, vomiting  . Epigastric pain. History of gastric ulcer. COMPARISON: 1/18/2017    CONTRAST:  95 mL of Isovue-370. TECHNIQUE:   Following the uneventful intravenous administration of contrast, thin axial  images were obtained through the abdomen and pelvis. Coronal and sagittal  reconstructions were generated. Oral contrast was not administered. CT dose  reduction was achieved through use of a standardized protocol tailored for this  examination and automatic exposure control for dose modulation. FINDINGS:   LUNG BASES: Clear. INCIDENTALLY IMAGED HEART AND MEDIASTINUM: Unremarkable. LIVER: No mass or biliary dilatation. GALLBLADDER: Unremarkable. SPLEEN: No mass. PANCREAS: No mass or ductal dilatation. ADRENALS: Unremarkable. KIDNEYS: No mass, calculus, or hydronephrosis. STOMACH: Unremarkable. SMALL BOWEL: No dilatation or wall thickening. COLON: No dilatation or wall thickening. The descending colon is completely  decompressed and difficult to assess. Although there may be is mild borderline  wall thickening, this is difficult to confirm. APPENDIX: Unremarkable. PERITONEUM: No ascites or pneumoperitoneum. RETROPERITONEUM: No lymphadenopathy or aortic aneurysm. REPRODUCTIVE ORGANS: Unremarkable for age. URINARY BLADDER: No mass or calculus. BONES: No destructive bone lesion. ADDITIONAL COMMENTS: N/A         Impression IMPRESSION:  No acute abdominal or pelvic abnormality is confirmed. No procedure found.     Chronic diagnoses   Problem List as of 9/2/2017  Date Reviewed: 9/1/2017          Codes Class Noted - Resolved    Intractable nausea and vomiting ICD-10-CM: R11.2  ICD-9-CM: 536.2  9/1/2017 - Present        Colitis ICD-10-CM: K52.9  ICD-9-CM: 558.9  1/18/2017 - Present              Discharge/Transfer Medications  Current Discharge Medication List      CONTINUE these medications which have CHANGED    Details   omeprazole (PRILOSEC) 20 mg capsule Take 1 Cap by mouth daily. Qty: 30 Cap, Refills: 0      ondansetron (ZOFRAN ODT) 4 mg disintegrating tablet Take 1 Tab by mouth every eight (8) hours as needed for Nausea. Qty: 20 Tab, Refills: 0         CONTINUE these medications which have NOT CHANGED    Details   promethazine (PHENERGAN) 25 mg tablet Take 1 Tab by mouth every six (6) hours as needed. Qty: 12 Tab, Refills: 0      sucralfate (CARAFATE) 100 mg/mL suspension Take 5 mL by mouth four (4) times daily. Qty: 414 mL, Refills: 0           Diet:  Regular diet.     Activity:  As tolerated  Disposition: Home or Self Care  Discharge instructions to patient/family  Please seek medical attention for any new or worsening symptoms particularly fever, chest pain, shortness of breath, abdominal pain, nausea, vomiting    Follow up plans/appointments  Follow-up Information     Follow up With Details Comments Contact Info    Soledad Dillon MD Go on 9/6/2017 For followup, appointment is at 2:20 pm.  [address] [facility]  [address]   [phone]      Remington Ogden MD  As needed [address]  [phone]             Jones Berkowitz MD  Family Medicine Resident       For Billing    Chief Complaint   Patient presents with    Nausea    Vomiting    Abdominal Pain       Hospital Problems  Date Reviewed: 9/1/2017          Codes Class Noted POA    Intractable nausea and vomiting ICD-10-CM: R11.2  ICD-9-CM: 536.2  9/1/2017 Unknown

## 2017-09-02 NOTE — PROGRESS NOTES
[address] [facility] [address] [place] [address]   Office [phone]  Fax [phone]          Assessment and Plan     July Sena is a 32 y.o. male with hx of gastric ulcer admitted for intractable nausea and vomiting. He has spent 0 night(s) in the hospital.    24 Hour Events: No acute events. - MAR: Pt has not required any pain medication or anti-nausea since yesterday late afternoon. No vomiting overnight. Has had NS continuous 110cc/hr. Intractable Nausea/Vomiting - likely due to his hx of ulcers and no chronic medications between his diagnosis in Jan 2017 and recently, although he has been compliant with his medications for the past 2 days since ED visit. POA Lactic acid and lipase were wnl, no leukocytosis, no fever.   - CT Abd/Pelvis negative for any acute abdominal pr pelvic abnormality.   - Lactic acid 1.5 and 1.1. Wnl.   - NPO with NS @ 110cc/hr  - Scheduled IV protonix, PRN zofran, morphine 1mg q4h - did not require any overnight  - Pt given green swabs for dry mouth, explained reasoning behind NPO  - Pending: gastric occult blood. - Pending: GI consult ordered for today. May need outpatient EGD. Will await final clearance from GI before removing NPO status.     Hypokalemia - 2/2 vomiting. RESOLVED this morning after IV repletion yest.     Elevated Cr - 1.16 POA, improved this morning to 1.02. Likely increased due to hypovolemia, resolving/improving with IVF. Difficult to assess baseline as every time patient has been here he was also hypovolemic and dehydrated with the same symptoms. FEN/GI - NPO. NS at 110 mL/hr. Activity - Ambulate as tolerated  DVT prophylaxis - SCDs  GI prophylaxis - Protonix  Fall prophylaxis - Not indicated at this time. Disposition - Admit to Medical. Plan to d/c to Home. Code Status - Full, discussed with patient / caregivers.   Next of Kin Name and Contact:  Lukas Jung, [phone]  I appreciate the opportunity to participate in the care of this patient,  Norman Holm MD  Family Medicine Resident         Subjective / Objective     Subjective  Temp (24hrs), Av °F (37.2 °C), Min:98.8 °F (37.1 °C), Max:99.4 °F (37.4 °C)     Pt sleeping comfortably this morning. /10 pain, much improved since admission. Patient getting up to urinate throughout the night. Endorses \"slight\" nausea, but no vomiting since he left the ED. Thinks his slight nausea is because he is hungry. Denies diarrhea. Continues to have belching. Denies SOB, CP, HA, dizziness. Complaining of dry mouth. No other complaints. Objective:  General Appearance:  Comfortable, well-appearing, in no acute distress and not in pain. Vital signs: (most recent): Blood pressure 122/75, pulse 72, temperature 98.8 °F (37.1 °C), resp. rate 16, height 5' 7\" (1.702 m), weight 160 lb 11.5 oz (72.9 kg), SpO2 97 %. HEENT: Normal HEENT exam.    Lungs:  Normal respiratory rate and normal effort. He is not in respiratory distress. No wheezes. Heart: Normal rate. Regular rhythm. Extremities: There is no dependent edema. Neurological: Patient is alert and oriented to person, place and time. Skin:  Warm. Abdomen: Abdomen is soft and non-distended. There are no signs of ascites. Bowel sounds are normal.   There is no rebound tenderness. There is no guarding.  (Mild epigastric and periumbilical tenderness, much improved from yesterday). There is no mass. There is no splenomegaly. There is no hepatomegaly.      Respiratory:   O2 Device: Room air     I/O:    Date 17 - 17 - 1759   Shift [id number] 24 Hour Total 5038-2544 3673-2994 24 Hour Total   I  N  T  A  K  E   Shift Total  (mL/kg)         O  U  T  P  U  T   Urine  (mL/kg/hr)            Urine Occurrence(s)  1 x 1 x       Shift Total  (mL/kg)         NET         Weight (kg) 71.2 72.9 72.9 72.9 72.9 72.9       CBC:  Recent Labs      17   0200  17   1633   WBC  9.5  9.2   HGB 13.9  14.3   HCT  39.1  40.7   PLT  312  728       Metabolic Panel:  Recent Labs      09/02/17   0200  09/01/17   1633   NA  140  140   K  3.6  3.4*   CL  103  102   CO2  27  28   BUN  9  13   CREA  1.02  1.16   GLU  92  119*   CA  9.0  9.5   ALB   --   4.4   SGOT   --   13*   ALT   --   23          For Billing    Chief Complaint   Patient presents with    Nausea    Vomiting    Abdominal Pain       Hospital Problems  Date Reviewed: 9/1/2017          Codes Class Noted POA    Intractable nausea and vomiting ICD-10-CM: R11.2  ICD-9-CM: 536.2  9/1/2017 Unknown

## 2017-09-02 NOTE — DISCHARGE INSTRUCTIONS
HOME DISCHARGE INSTRUCTIONS    [facility] / [id number] : 1990    Admission date: 2017 Discharge date: 2017     Please bring this form with you to show your care provider at your follow-up appointment. Primary care provider:  Mercedes Francisco MD    Discharging provider:  Mindy Oneill MD  - Family Medicine Resident  Justyn Deal MD - Attending, Family Medicine     You have been admitted to the hospital with the following diagnoses:    ACUTE DIAGNOSES:  · Intractable nausea and vomiting  . . . . . . . . . . . . . . . . . . . . . . . . . . . . . . . . . . . . . . . . . . . . . . . . . . . . . . . . . . . . . . . . . . . . . . . Rajeev Helms FOLLOW-UP CARE RECOMMENDATIONS:    Medication changes: None    Appointments: Listed on page 2 of these documents. Follow-up tests needed:   Please follow up with GI outpatient as directed by them. Pending test results: At the time of your discharge the following test results are still pending: None. Please make sure you review these results with your outpatient follow-up provider(s). Specific symptoms to watch for: chest pain, shortness of breath, fever, chills, nausea, vomiting, diarrhea, change in mentation, falling, weakness, bleeding. DIET/what to eat: Regular diet avoiding meats, chocolate, caffeine, and any type of smoking. ACTIVITY:  Activity as tolerated    Wound care: None    Equipment needed:  None    What to do if new or unexpected symptoms occur? If you experience any of the above symptoms (or should other concerns or questions arise after discharge) please call your primary care physician. Return to the emergency room if you cannot get hold of your doctor. · It is very important that you keep your follow-up appointment(s). · Please bring discharge papers, medication list (and/or medication bottles) to your follow-up appointments for review by your outpatient provider(s).   · Please check the list of medications and be sure it includes every medication (even non-prescription medications) that your provider wants you to take. · It is important that you take the medication exactly as they are prescribed. · Keep your medication in the bottles provided by the pharmacist and keep a list of the medication names, dosages, and times to be taken in your wallet. · Do not take other medications without consulting your doctor. · If you have any questions about your medications or other instructions, please talk to your nurse or care provider before you leave the hospital.     Information obtained by:     I understand that if any problems occur once I am at home I am to contact my physician. These instructions were explained to me and I had the opportunity to ask questions. I understand and acknowledge receipt of the instructions indicated above. Physician's or R.N.'s Signature                                                                  Date/Time                                                                                                                                              Patient or Representative Signature                                                          Date/Time                 Nausea and Vomiting: Care Instructions  Your Care Instructions    When you are nauseated, you may feel weak and sweaty and notice a lot of saliva in your mouth. Nausea often leads to vomiting. Most of the time you do not need to worry about nausea and vomiting, but they can be signs of other illnesses. Two common causes of nausea and vomiting are stomach flu and food poisoning. Nausea and vomiting from viral stomach flu will usually start to improve within 24 hours. Nausea and vomiting from food poisoning may last from 12 to 48 hours.   The doctor has checked you carefully, but problems can develop later. If you notice any problems or new symptoms, get medical treatment right away. Follow-up care is a key part of your treatment and safety. Be sure to make and go to all appointments, and call your doctor if you are having problems. It's also a good idea to know your test results and keep a list of the medicines you take. How can you care for yourself at home? · To prevent dehydration, drink plenty of fluids, enough so that your urine is light yellow or clear like water. Choose water and other caffeine-free clear liquids until you feel better. If you have kidney, heart, or liver disease and have to limit fluids, talk with your doctor before you increase the amount of fluids you drink. · Rest in bed until you feel better. · When you are able to eat, try clear soups, mild foods, and liquids until all symptoms are gone for 12 to 48 hours. Other good choices include dry toast, crackers, cooked cereal, and gelatin dessert, such as Jell-O. When should you call for help? Call 911 anytime you think you may need emergency care. For example, call if:  · You passed out (lost consciousness). Call your doctor now or seek immediate medical care if:  · You have symptoms of dehydration, such as:  ¨ Dry eyes and a dry mouth. ¨ Passing only a little dark urine. ¨ Feeling thirstier than usual.  · You have new or worsening belly pain. · You have a new or higher fever. · You vomit blood or what looks like coffee grounds. Watch closely for changes in your health, and be sure to contact your doctor if:  · You have ongoing nausea and vomiting. · Your vomiting is getting worse. · Your vomiting lasts longer than 2 days. · You are not getting better as expected. Where can you learn more? Go to http://isaac-zoraida.info/. Enter 25 [id number] in the search box to learn more about \"Nausea and Vomiting: Care Instructions. \"  Current as of: March 20, 2017  Content Version: 11.3  © 0422-9619 [employer]. Care instructions adapted under license by [employer] (which disclaims liability or warranty for this information). If you have questions about a medical condition or this instruction, always ask your healthcare professional. [address] any warranty or liability for your use of this information. Peptic Ulcer Disease: Care Instructions  Your Care Instructions    Peptic ulcers are sores on the inside of the stomach or the small intestine. They are usually caused by an infection with bacteria or from use of nonsteroidal anti-inflammatory drugs (NSAIDs). NSAIDs include aspirin, ibuprofen (Advil), and naproxen (Aleve). Your doctor may have prescribed medicine to reduce stomach acid. You also may need to take antibiotics if your peptic ulcers are caused by an infection. You can help your stomach heal and keep ulcers from coming back by making some changes in your lifestyle. Quit smoking, limit caffeine and alcohol, and reduce stress. Follow-up care is a key part of your treatment and safety. Be sure to make and go to all appointments, and call your doctor if you are having problems. Its also a good idea to know your test results and keep a list of the medicines you take. How can you care for yourself at home? · Take your medicines exactly as prescribed. Call your doctor if you think you are having a problem with your medicine. · Do not take aspirin or other NSAIDs such as ibuprofen (Advil or Motrin) or naproxen (Aleve). Ask your doctor what you can take for pain. · Do not smoke. Smoking can make ulcers worse. If you need help quitting, talk to your doctor about stop-smoking programs and medicines. These can increase your chances of quitting for good. · Drink in moderation or avoid drinking alcohol. · Do not drink beverages that have caffeine if they bother your stomach. These include coffee, tea, and soda. · Eat a balanced diet of small, frequent meals. Make an appointment with a dietitian if you need help planning your meals. · Reduce stress. Avoid people and places that make you feel anxious, if you can. Learn ways to reduce stress, such as biofeedback, guided imagery, and meditation. When should you call for help? Call 911 anytime you think you may need emergency care. For example, call if:  · You passed out (lost consciousness). · You vomit blood or what looks like coffee grounds. · You pass maroon or very bloody stools. Call your doctor now or seek immediate medical care if:  · You have severe pain in your belly, back, or shoulders. · You have new or worsening belly pain. · You are dizzy or lightheaded, or you feel like you may faint. · Your stools are black and tarlike or have streaks of blood. Watch closely for changes in your health, and be sure to contact your doctor if:  · You have new symptoms such as weight loss, nausea or vomiting. · You do not feel better as expected. Where can you learn more? Go to http://isaac-zoraida.info/. Enter V469 in the search box to learn more about \"Peptic Ulcer Disease: Care Instructions. \"  Current as of: August 9, 2016  Content Version: 11.3  © 9731-9923 [employer]. Care instructions adapted under license by [employer] (which disclaims liability or warranty for this information). If you have questions about a medical condition or this instruction, always ask your healthcare professional. [place] [address] any warranty or liability for your use of this information.

## 2017-09-04 LAB
ATRIAL RATE: 85 BPM
CALCULATED P AXIS, ECG09: 55 DEGREES
CALCULATED R AXIS, ECG10: 7 DEGREES
CALCULATED T AXIS, ECG11: -8 DEGREES
DIAGNOSIS, 93000: NORMAL
P-R INTERVAL, ECG05: 130 MS
Q-T INTERVAL, ECG07: 364 MS
QRS DURATION, ECG06: 80 MS
QTC CALCULATION (BEZET), ECG08: 433 MS
VENTRICULAR RATE, ECG03: 85 BPM

## 2017-09-06 ENCOUNTER — OFFICE VISIT (OUTPATIENT)
Dept: FAMILY MEDICINE CLINIC | Age: 27
End: 2017-09-06

## 2017-09-06 VITALS
TEMPERATURE: 98.3 F | SYSTOLIC BLOOD PRESSURE: 120 MMHG | HEIGHT: 67 IN | RESPIRATION RATE: 16 BRPM | BODY MASS INDEX: 24.8 KG/M2 | OXYGEN SATURATION: 98 % | WEIGHT: 158 LBS | HEART RATE: 71 BPM | DIASTOLIC BLOOD PRESSURE: 82 MMHG

## 2017-09-06 DIAGNOSIS — R11.2 INTRACTABLE VOMITING WITH NAUSEA, UNSPECIFIED VOMITING TYPE: Primary | ICD-10-CM

## 2017-09-06 NOTE — PROGRESS NOTES
Chief Complaint   Patient presents with   [facility] Follow Up     hospital f/u 9/1/17     Patient seen in the office today for hospital follow up on 9/1/17.  He has no concerns at this time

## 2017-09-06 NOTE — PROGRESS NOTES
Chief Complaint   Patient presents with   [facility] Follow Up     9/1/17 @ [facility] for nausea and retractable vomiting     Patient seen in the office today for hospital follow up on 9/1/17. He has no concerns at this time    Pt has been taking all medications regularly, reports feeling much better. Subjective: (As above and below)     Chief Complaint   Patient presents with   [facility] Follow Up     9/1/17 @ [facility] for nausea and retractable vomiting     he is a 32y.o. year old male who presents for evaluation. Reviewed PmHx, RxHx, FmHx, SocHx, AllgHx and updated in chart. Review of Systems - negative except as listed above    Objective:     Vitals:    09/06/17 1446   BP: 120/82   Pulse: 71   Resp: 16   Temp: 98.3 °F (36.8 °C)   TempSrc: Oral   SpO2: 98%   Weight: 158 lb (71.7 kg)   Height: 5' 7\" (1.702 m)     Physical Examination: General appearance - alert, well appearing, and in no distress  Mental status - normal mood, behavior, speech, dress, motor activity, and thought processes  Mouth - mucous membranes moist, pharynx normal without lesions  Chest - clear to auscultation, no wheezes, rales or rhonchi, symmetric air entry  Heart - normal rate, regular rhythm, normal S1, S2, no murmurs, rubs, clicks or gallops    Assessment/ Plan:   1. Intractable vomiting with nausea, unspecified vomiting type  -resolved, take medication as directed       I have discussed the diagnosis with the patient and the intended plan as seen in the above orders. The patient has received an after-visit summary and questions were answered concerning future plans.      Medication Side Effects and Warnings were discussed with patient: yes  Patient Labs were reviewed: yes  Patient Past Records were reviewed:  yes    Ayanna Coles M.D.

## 2018-01-16 ENCOUNTER — APPOINTMENT (OUTPATIENT)
Dept: GENERAL RADIOLOGY | Age: 28
End: 2018-01-16
Attending: EMERGENCY MEDICINE
Payer: SELF-PAY

## 2018-01-16 ENCOUNTER — HOSPITAL ENCOUNTER (EMERGENCY)
Age: 28
Discharge: HOME | End: 2018-01-16
Attending: EMERGENCY MEDICINE
Payer: SELF-PAY

## 2018-01-16 VITALS
HEART RATE: 64 BPM | WEIGHT: 160 LBS | SYSTOLIC BLOOD PRESSURE: 118 MMHG | OXYGEN SATURATION: 98 % | TEMPERATURE: 98.6 F | RESPIRATION RATE: 18 BRPM | HEIGHT: 68 IN | DIASTOLIC BLOOD PRESSURE: 74 MMHG | BODY MASS INDEX: 24.25 KG/M2

## 2018-01-16 DIAGNOSIS — F12.10 MARIJUANA ABUSE: ICD-10-CM

## 2018-01-16 DIAGNOSIS — D72.829 LEUKOCYTOSIS, UNSPECIFIED TYPE: ICD-10-CM

## 2018-01-16 DIAGNOSIS — R10.84 ABDOMINAL PAIN, GENERALIZED: ICD-10-CM

## 2018-01-16 DIAGNOSIS — R11.2 NAUSEA AND VOMITING, INTRACTABILITY OF VOMITING NOT SPECIFIED, UNSPECIFIED VOMITING TYPE: Primary | ICD-10-CM

## 2018-01-16 LAB
ALBUMIN SERPL-MCNC: 4.6 G/DL (ref 3.5–5)
ALBUMIN/GLOB SERPL: 1.4 {RATIO} (ref 1.1–2.2)
ALP SERPL-CCNC: 70 U/L (ref 45–117)
ALT SERPL-CCNC: 23 U/L (ref 12–78)
AMORPH CRY URNS QL MICRO: ABNORMAL
AMPHET UR QL SCN: NEGATIVE
ANION GAP SERPL CALC-SCNC: 12 MMOL/L (ref 5–15)
APPEARANCE UR: ABNORMAL
AST SERPL-CCNC: 13 U/L (ref 15–37)
BACTERIA URNS QL MICRO: NEGATIVE /HPF
BARBITURATES UR QL SCN: NEGATIVE
BASOPHILS # BLD: 0 K/UL (ref 0–0.1)
BASOPHILS NFR BLD: 0 % (ref 0–1)
BENZODIAZ UR QL: NEGATIVE
BILIRUB SERPL-MCNC: 0.6 MG/DL (ref 0.2–1)
BILIRUB UR QL: NEGATIVE
BUN SERPL-MCNC: 16 MG/DL (ref 6–20)
BUN/CREAT SERPL: 13 (ref 12–20)
CALCIUM SERPL-MCNC: 10.1 MG/DL (ref 8.5–10.1)
CANNABINOIDS UR QL SCN: POSITIVE
CHLORIDE SERPL-SCNC: 102 MMOL/L (ref 97–108)
CO2 SERPL-SCNC: 26 MMOL/L (ref 21–32)
COCAINE UR QL SCN: NEGATIVE
COLOR UR: ABNORMAL
CREAT SERPL-MCNC: 1.21 MG/DL (ref 0.7–1.3)
DRUG SCRN COMMENT,DRGCM: ABNORMAL
EOSINOPHIL # BLD: 0 K/UL (ref 0–0.4)
EOSINOPHIL NFR BLD: 0 % (ref 0–7)
EPITH CASTS URNS QL MICRO: ABNORMAL /LPF
ERYTHROCYTE [DISTWIDTH] IN BLOOD BY AUTOMATED COUNT: 12.5 % (ref 11.5–14.5)
GLOBULIN SER CALC-MCNC: 3.4 G/DL (ref 2–4)
GLUCOSE SERPL-MCNC: 173 MG/DL (ref 65–100)
GLUCOSE UR STRIP.AUTO-MCNC: NEGATIVE MG/DL
HCT VFR BLD AUTO: 41.1 % (ref 36.6–50.3)
HGB BLD-MCNC: 14.7 G/DL (ref 12.1–17)
HGB UR QL STRIP: NEGATIVE
KETONES UR QL STRIP.AUTO: 15 MG/DL
LEUKOCYTE ESTERASE UR QL STRIP.AUTO: NEGATIVE
LIPASE SERPL-CCNC: 85 U/L (ref 73–393)
LYMPHOCYTES # BLD: 1.1 K/UL (ref 0.8–3.5)
LYMPHOCYTES NFR BLD: 7 % (ref 12–49)
MCH RBC QN AUTO: 30.9 PG (ref 26–34)
MCHC RBC AUTO-ENTMCNC: 35.8 G/DL (ref 30–36.5)
MCV RBC AUTO: 86.5 FL (ref 80–99)
METHADONE UR QL: NEGATIVE
MONOCYTES # BLD: 0.7 K/UL (ref 0–1)
MONOCYTES NFR BLD: 4 % (ref 5–13)
NEUTS SEG # BLD: 14.6 K/UL (ref 1.8–8)
NEUTS SEG NFR BLD: 89 % (ref 32–75)
NITRITE UR QL STRIP.AUTO: NEGATIVE
OPIATES UR QL: NEGATIVE
PCP UR QL: NEGATIVE
PH UR STRIP: 5.5 [PH] (ref 5–8)
PLATELET # BLD AUTO: 402 K/UL (ref 150–400)
POTASSIUM SERPL-SCNC: 3.9 MMOL/L (ref 3.5–5.1)
PROT SERPL-MCNC: 8 G/DL (ref 6.4–8.2)
PROT UR STRIP-MCNC: 30 MG/DL
RBC # BLD AUTO: 4.75 M/UL (ref 4.1–5.7)
RBC #/AREA URNS HPF: ABNORMAL /HPF (ref 0–5)
SODIUM SERPL-SCNC: 140 MMOL/L (ref 136–145)
SP GR UR REFRACTOMETRY: >1.03 (ref 1–1.03)
UR CULT HOLD, URHOLD: NORMAL
UROBILINOGEN UR QL STRIP.AUTO: 0.2 EU/DL (ref 0.2–1)
WBC # BLD AUTO: 16.4 K/UL (ref 4.1–11.1)
WBC URNS QL MICRO: ABNORMAL /HPF (ref 0–4)

## 2018-01-16 PROCEDURE — 81001 URINALYSIS AUTO W/SCOPE: CPT | Performed by: EMERGENCY MEDICINE

## 2018-01-16 PROCEDURE — 83690 ASSAY OF LIPASE: CPT | Performed by: EMERGENCY MEDICINE

## 2018-01-16 PROCEDURE — 74011250636 HC RX REV CODE- 250/636: Performed by: EMERGENCY MEDICINE

## 2018-01-16 PROCEDURE — 80053 COMPREHEN METABOLIC PANEL: CPT | Performed by: EMERGENCY MEDICINE

## 2018-01-16 PROCEDURE — 80307 DRUG TEST PRSMV CHEM ANLYZR: CPT | Performed by: EMERGENCY MEDICINE

## 2018-01-16 PROCEDURE — 99284 EMERGENCY DEPT VISIT MOD MDM: CPT

## 2018-01-16 PROCEDURE — 74011250637 HC RX REV CODE- 250/637: Performed by: EMERGENCY MEDICINE

## 2018-01-16 PROCEDURE — 85025 COMPLETE CBC W/AUTO DIFF WBC: CPT | Performed by: EMERGENCY MEDICINE

## 2018-01-16 PROCEDURE — 36415 COLL VENOUS BLD VENIPUNCTURE: CPT | Performed by: EMERGENCY MEDICINE

## 2018-01-16 PROCEDURE — 96374 THER/PROPH/DIAG INJ IV PUSH: CPT

## 2018-01-16 PROCEDURE — 74011000250 HC RX REV CODE- 250: Performed by: EMERGENCY MEDICINE

## 2018-01-16 PROCEDURE — 96372 THER/PROPH/DIAG INJ SC/IM: CPT

## 2018-01-16 PROCEDURE — 96375 TX/PRO/DX INJ NEW DRUG ADDON: CPT

## 2018-01-16 PROCEDURE — 74022 RADEX COMPL AQT ABD SERIES: CPT

## 2018-01-16 PROCEDURE — 96361 HYDRATE IV INFUSION ADD-ON: CPT

## 2018-01-16 RX ORDER — DICYCLOMINE HYDROCHLORIDE 10 MG/ML
20 INJECTION INTRAMUSCULAR
Status: COMPLETED | OUTPATIENT
Start: 2018-01-16 | End: 2018-01-16

## 2018-01-16 RX ORDER — HALOPERIDOL 5 MG/ML
5 INJECTION INTRAMUSCULAR
Status: COMPLETED | OUTPATIENT
Start: 2018-01-16 | End: 2018-01-16

## 2018-01-16 RX ORDER — FAMOTIDINE 10 MG/ML
20 INJECTION INTRAVENOUS
Status: COMPLETED | OUTPATIENT
Start: 2018-01-16 | End: 2018-01-16

## 2018-01-16 RX ORDER — DICYCLOMINE HYDROCHLORIDE 20 MG/1
20 TABLET ORAL EVERY 6 HOURS
Qty: 20 TAB | Refills: 0 | Status: SHIPPED | OUTPATIENT
Start: 2018-01-16 | End: 2018-01-21

## 2018-01-16 RX ORDER — SODIUM CHLORIDE 0.9 % (FLUSH) 0.9 %
5-10 SYRINGE (ML) INJECTION AS NEEDED
Status: DISCONTINUED | OUTPATIENT
Start: 2018-01-16 | End: 2018-01-16 | Stop reason: HOSPADM

## 2018-01-16 RX ORDER — ONDANSETRON 4 MG/1
4 TABLET, ORALLY DISINTEGRATING ORAL
Qty: 10 TAB | Refills: 0 | Status: SHIPPED | OUTPATIENT
Start: 2018-01-16 | End: 2019-02-21

## 2018-01-16 RX ORDER — CAPSAICIN 0.03 G/100G
CREAM TOPICAL
Status: COMPLETED | OUTPATIENT
Start: 2018-01-16 | End: 2018-01-16

## 2018-01-16 RX ORDER — SODIUM CHLORIDE 0.9 % (FLUSH) 0.9 %
5-10 SYRINGE (ML) INJECTION EVERY 8 HOURS
Status: DISCONTINUED | OUTPATIENT
Start: 2018-01-16 | End: 2018-01-16 | Stop reason: HOSPADM

## 2018-01-16 RX ORDER — ONDANSETRON 2 MG/ML
4 INJECTION INTRAMUSCULAR; INTRAVENOUS
Status: COMPLETED | OUTPATIENT
Start: 2018-01-16 | End: 2018-01-16

## 2018-01-16 RX ADMIN — SODIUM CHLORIDE 1000 ML: 900 INJECTION, SOLUTION INTRAVENOUS at 04:37

## 2018-01-16 RX ADMIN — SODIUM CHLORIDE 1000 ML: 900 INJECTION, SOLUTION INTRAVENOUS at 06:12

## 2018-01-16 RX ADMIN — ONDANSETRON 4 MG: 2 INJECTION INTRAMUSCULAR; INTRAVENOUS at 04:39

## 2018-01-16 RX ADMIN — LIDOCAINE HYDROCHLORIDE 40 ML: 20 SOLUTION ORAL; TOPICAL at 06:12

## 2018-01-16 RX ADMIN — DICYCLOMINE HYDROCHLORIDE 20 MG: 10 INJECTION INTRAMUSCULAR at 04:40

## 2018-01-16 RX ADMIN — Medication 10 ML: at 06:13

## 2018-01-16 RX ADMIN — FAMOTIDINE 20 MG: 10 INJECTION, SOLUTION INTRAVENOUS at 06:12

## 2018-01-16 RX ADMIN — HALOPERIDOL LACTATE 5 MG: 5 INJECTION, SOLUTION INTRAMUSCULAR at 05:58

## 2018-01-16 RX ADMIN — CAPSAICIN: 0.25 CREAM TOPICAL at 06:24

## 2018-01-16 NOTE — ED PROVIDER NOTES
HPI Comments: Chaparro Forbes is a 32 y.o. male who presents via private vehicle to the ED with a c/o N/V and abdominal pain. Patient states that he started having abdominal pain Monday morning. Patient states that this was accompanied by nausea vomiting. Patient has a fever. Patient states that the vomiting has continued to worsen throughout the day on Monday and Tuesday morning. Patient also states he's had a some loose bowel movements. Patient states he has history of GERD and peptic ulcer disease. Patient states he's had an EGD for this in the past. Patient states he is supposed to be on Carafate and Prilosec. Patient does state that he uses marijuana frequently. Patient states that his last time was on Sunday night. Patient states that he has not changed dealers or got it from another source. Patient states that he has tried hot showers which seemed to alleviate his symptoms briefly. PCP: Leonor Aguilera  PMHx significant for: PUD, GERD  PSHx significant for: surgery as infant for heart murmur  Social Hx: Tobacco: daily EtOH: denies Illicit drug use: marijuana    There are no further complaints or symptoms at this time. Patient is a 32 y.o. male presenting with abdominal pain and vomiting. The history is provided by the patient. No  was used. Abdominal Pain    This is a recurrent problem. The current episode started yesterday. The problem occurs hourly. The problem has been gradually worsening. The pain is associated with vomiting. The pain is located in the generalized abdominal region. The quality of the pain is aching and cramping. The pain is at a severity of 7/10. The pain is moderate. Associated symptoms include diarrhea, nausea and vomiting. Pertinent negatives include no fever, no constipation, no dysuria, no hematuria, no headaches, no myalgias, no chest pain and no back pain. The pain is worsened by vomiting. The pain is relieved by nothing.  Past workup includes esophagogastroduodenoscopy. Past workup includes no surgery. His past medical history is significant for PUD and GERD. His past medical history does not include UTI or DM. The patient's surgical history non-contributory. Vomiting    This is a recurrent problem. The current episode started yesterday. The problem occurs more than 10 times per day. The problem has been gradually worsening. The emesis has an appearance of stomach contents. There has been no fever. Associated symptoms include abdominal pain and diarrhea. Pertinent negatives include no chills, no fever, no sweats, no headaches, no myalgias, no cough, no URI and no headaches. His pertinent negatives include no DM. Past Medical History:   Diagnosis Date    Gastric ulcer     due to drug use    Heart murmur of      Hx of drug abuse     Ulcer ([facility] [address]        Past Surgical History:   Procedure Laterality Date    HX OTHER SURGICAL      infant heart murmur         Family History:   Problem Relation Age of Onset    Migraines Mother        Social History     Social History    Marital status:      Spouse name: N/A    Number of children: N/A    Years of education: N/A     Occupational History    Not on file. Social History Main Topics    Smoking status: Current Some Day Smoker    Smokeless tobacco: Never Used    Alcohol use No    Drug use: Yes     Special: Marijuana    Sexual activity: Yes     Partners: Female     Other Topics Concern    Not on file     Social History Narrative    ** Merged History Encounter **          ALLERGIES: Review of patient's allergies indicates no known allergies. Review of Systems   Constitutional: Negative for appetite change, chills, fever and unexpected weight change. HENT: Negative for ear pain, hearing loss, rhinorrhea and trouble swallowing. Eyes: Negative for pain and visual disturbance. Respiratory: Negative for cough, chest tightness and shortness of breath.     Cardiovascular: Negative for chest pain and palpitations. Gastrointestinal: Positive for abdominal pain, diarrhea, nausea and vomiting. Negative for abdominal distention, anal bleeding, blood in stool and constipation. Genitourinary: Negative for dysuria, hematuria and urgency. Musculoskeletal: Negative for back pain and myalgias. Skin: Negative for rash. Neurological: Negative for dizziness, syncope, weakness, numbness and headaches. Psychiatric/Behavioral: Negative for confusion and suicidal ideas. All other systems reviewed and are negative. Vitals:    01/16/18 0423   BP: 121/77   Pulse: 64   Resp: 18   Temp: 98.6 °F (37 °C)   SpO2: 97%   Weight: 72.6 kg (160 lb)   Height: 5' 8\" (1.727 m)            Physical Exam   Constitutional: He is oriented to person, place, and time. He appears well-developed and well-nourished. No distress. Actively vomiting   HENT:   Head: Normocephalic and atraumatic. Right Ear: External ear normal.   Left Ear: External ear normal.   Nose: Nose normal.   Mouth/Throat: Uvula is midline. Mucous membranes are dry. No uvula swelling. No oropharyngeal exudate. Eyes: Conjunctivae and EOM are normal. Pupils are equal, round, and reactive to light. Right eye exhibits no discharge. Left eye exhibits no discharge. No scleral icterus. Neck: Normal range of motion. Neck supple. No JVD present. No tracheal deviation present. Cardiovascular: Normal rate, regular rhythm, normal heart sounds and intact distal pulses. Exam reveals no gallop and no friction rub. No murmur heard. Pulmonary/Chest: Effort normal and breath sounds normal. No stridor. No respiratory distress. He has no decreased breath sounds. He has no wheezes. He has no rhonchi. He has no rales. He exhibits no tenderness. Abdominal: Soft. Bowel sounds are normal. He exhibits no distension. There is generalized tenderness. There is no rebound and no guarding. Musculoskeletal: Normal range of motion.  He exhibits no edema or tenderness. Neurological: He is alert and oriented to person, place, and time. He has normal strength and normal reflexes. No cranial nerve deficit or sensory deficit. He exhibits normal muscle tone. Coordination normal. GCS eye subscore is 4. GCS verbal subscore is 5. GCS motor subscore is 6. Skin: Skin is warm and dry. No rash noted. He is not diaphoretic. No erythema. No pallor. Psychiatric: He has a normal mood and affect. His behavior is normal. Judgment and thought content normal.   Nursing note and vitals reviewed. MDM  Number of Diagnoses or Management Options  Abdominal pain, generalized:   Leukocytosis, unspecified type:   Marijuana abuse:   Nausea and vomiting, intractability of vomiting not specified, unspecified vomiting type:      Amount and/or Complexity of Data Reviewed  Clinical lab tests: ordered and reviewed  Tests in the radiology section of CPT®: ordered and reviewed    Risk of Complications, Morbidity, and/or Mortality  Presenting problems: moderate  Diagnostic procedures: low  Management options: moderate    Patient Progress  Patient progress: stable    ED Course       Procedures    Chief Complaint   Patient presents with    Abdominal Pain    Vomiting       The patient's presenting problems have been discussed, and they are in agreement with the care plan formulated and outlined with them. I have encouraged them to ask questions as they arise throughout their visit.     MEDICATIONS GIVEN:  Medications   sodium chloride (NS) flush 5-10 mL (10 mL IntraVENous Given 1/16/18 0699)   sodium chloride (NS) flush 5-10 mL (not administered)   sodium chloride 0.9 % bolus infusion 1,000 mL (1,000 mL IntraVENous New Bag 1/16/18 0612)   sodium chloride 0.9 % bolus infusion 1,000 mL (0 mL IntraVENous IV Completed 1/16/18 5342)   ondansetron (ZOFRAN) injection 4 mg (4 mg IntraVENous Given 1/16/18 4815)   dicyclomine (BENTYL) 10 mg/mL injection 20 mg (20 mg IntraMUSCular Given 1/16/18 6344) haloperidol lactate (HALDOL) injection 5 mg (5 mg IntraMUSCular Given 1/16/18 0558)   famotidine (PF) (PEPCID) injection 20 mg (20 mg IntraVENous Given 1/16/18 0612)   mylanta/viscous lidocaine (LEVI)(GI COCKTAIL) (40 mL Oral Given 1/16/18 0612)   capsicum oleoresin 0.025 % cream ( Topical Given 1/16/18 0624)       LABS REVIEWED:  Recent Results (from the past 24 hour(s))   CBC WITH AUTOMATED DIFF    Collection Time: 01/16/18  4:37 AM   Result Value Ref Range    WBC 16.4 (H) 4.1 - 11.1 K/uL    RBC 4.75 4.10 - 5.70 M/uL    HGB 14.7 12.1 - 17.0 g/dL    HCT 41.1 36.6 - 50.3 %    MCV 86.5 80.0 - 99.0 FL    MCH 30.9 26.0 - 34.0 PG    MCHC 35.8 30.0 - 36.5 g/dL    RDW 12.5 11.5 - 14.5 %    PLATELET 345 (H) 621 - 400 K/uL    NEUTROPHILS 89 (H) 32 - 75 %    LYMPHOCYTES 7 (L) 12 - 49 %    MONOCYTES 4 (L) 5 - 13 %    EOSINOPHILS 0 0 - 7 %    BASOPHILS 0 0 - 1 %    ABS. NEUTROPHILS 14.6 (H) 1.8 - 8.0 K/UL    ABS. LYMPHOCYTES 1.1 0.8 - 3.5 K/UL    ABS. MONOCYTES 0.7 0.0 - 1.0 K/UL    ABS. EOSINOPHILS 0.0 0.0 - 0.4 K/UL    ABS. BASOPHILS 0.0 0.0 - 0.1 K/UL   METABOLIC PANEL, COMPREHENSIVE    Collection Time: 01/16/18  4:37 AM   Result Value Ref Range    Sodium 140 136 - 145 mmol/L    Potassium 3.9 3.5 - 5.1 mmol/L    Chloride 102 97 - 108 mmol/L    CO2 26 21 - 32 mmol/L    Anion gap 12 5 - 15 mmol/L    Glucose 173 (H) 65 - 100 mg/dL    BUN 16 6 - 20 MG/DL    Creatinine 1.21 0.70 - 1.30 MG/DL    BUN/Creatinine ratio 13 12 - 20      GFR est AA >60 >60 ml/min/1.73m2    GFR est non-AA >60 >60 ml/min/1.73m2    Calcium 10.1 8.5 - 10.1 MG/DL    Bilirubin, total 0.6 0.2 - 1.0 MG/DL    ALT (SGPT) 23 12 - 78 U/L    AST (SGOT) 13 (L) 15 - 37 U/L    Alk.  phosphatase 70 45 - 117 U/L    Protein, total 8.0 6.4 - 8.2 g/dL    Albumin 4.6 3.5 - 5.0 g/dL    Globulin 3.4 2.0 - 4.0 g/dL    A-G Ratio 1.4 1.1 - 2.2     LIPASE    Collection Time: 01/16/18  4:37 AM   Result Value Ref Range    Lipase 85 73 - 393 U/L   URINE CULTURE HOLD SAMPLE Collection Time: 01/16/18  6:25 AM   Result Value Ref Range    Urine culture hold URINE ON HOLD IN MICROBIOLOGY DEPT FOR 3 DAYS         VITAL SIGNS:  Patient Vitals for the past 12 hrs:   Temp Pulse Resp BP SpO2   01/16/18 0423 98.6 °F (37 °C) 64 18 121/77 97 %       RADIOLOGY RESULTS:  The following have been ordered and reviewed:  Xr Abd Acute W 1 V Chest    Result Date: 1/16/2018  INDICATION:   abdominal pain EXAM:  ACUTE ABDOMINAL SERIES COMPARISON:  August 30, 2017 FINDINGS: Single view of the chest demonstrates a normal cardiomediastinal silhouette. The lungs are well expanded and clear. There is no free intraperitoneal air. Supine and upright/decubitus views of the abdomen demonstrate overall paucity of bowel gas. There are no abnormal calcifications. The osseous structures are unremarkable. IMPRESSION: No acute cardiopulmonary process. Nonspecific paucity of bowel gas. PROGRESS NOTES:  Pt feeling better. Pt given capasasin cream to apply to abdomen. Pt did not tolerate too well, it was wiped off. Pt tolerating po. Discussed results and plan with patient. Patient will be discharged home with PCP and GI followup. Patient instructed to return to the emergency room for any worsening symptoms or any other concerns. DIAGNOSIS:    1. Nausea and vomiting, intractability of vomiting not specified, unspecified vomiting type    2. Marijuana abuse    3. Leukocytosis, unspecified type    4.  Abdominal pain, generalized        PLAN:  Follow-up Information     Follow up With Details Comments [address] Ric[address] Trixie Aguilera MD Schedule an appointment as soon as possible for a visit  [address]  [address] [facility]  [phone]      Tiffanie Sanz MD Schedule an appointment as soon as possible for a visit  [facility] [address]      [facility] EMERGENCY DEPT  If symptoms worsen [address] [facility]  [phone]        Current Discharge Medication List      START taking these medications    Details   dicyclomine (BENTYL) 20 mg tablet Take 1 Tab by mouth every six (6) hours for 20 doses. Qty: 20 Tab, Refills: 0         CONTINUE these medications which have CHANGED    Details   ondansetron (ZOFRAN ODT) 4 mg disintegrating tablet Take 1 Tab by mouth every eight (8) hours as needed for Nausea. Qty: 10 Tab, Refills: 0         CONTINUE these medications which have NOT CHANGED    Details   omeprazole (PRILOSEC) 20 mg capsule Take 1 Cap by mouth daily. Qty: 30 Cap, Refills: 0      promethazine (PHENERGAN) 25 mg tablet Take 1 Tab by mouth every six (6) hours as needed. Qty: 12 Tab, Refills: 0      sucralfate (CARAFATE) 100 mg/mL suspension Take 5 mL by mouth four (4) times daily. Qty: 414 mL, Refills: 0             ED COURSE: The patient's hospital course has been uncomplicated.

## 2018-01-16 NOTE — DISCHARGE INSTRUCTIONS
We hope that we have addressed all of your medical concerns. The examination and treatment you received in the Emergency Department were for an emergent problem and were not intended as complete care. It is important that you follow up with your healthcare provider(s) for ongoing care. If your symptoms worsen or do not improve as expected, and you are unable to reach your usual health care provider(s), you should return to the Emergency Department. Today's healthcare is undergoing tremendous change, and patient satisfaction surveys are one of the many tools to assess the quality of medical care. You may receive a survey from the [employer] regarding your experience in the Emergency Department. I hope that your experience has been completely positive, particularly the medical care that I provided. As such, please participate in the survey; anything less than excellent does not meet my expectations or intentions. [facility] and 8 [facility] participate in nationally recognized quality of care measures. If your blood pressure is greater than 120/80, as reported below, we urge that you seek medical care to address the potential of high blood pressure, commonly known as hypertension. Hypertension can be hereditary or can be caused by certain medical conditions, pain, stress, or \"white coat syndrome. \"       Please make an appointment with your health care provider(s) for follow up of your Emergency Department visit. VITALS:   Patient Vitals for the past 8 hrs:   Temp Pulse Resp BP SpO2   01/16/18 0423 98.6 °F (37 °C) 64 18 121/77 97 %          Thank you for allowing us to provide you with medical care today. We realize that you have many choices for your emergency care needs. Please choose us in the future for any continued health care needs. Alyx Gonsalez, [address]. Office: [phone]            Recent Results (from the past 24 hour(s))   CBC WITH AUTOMATED DIFF    Collection Time: 01/16/18  4:37 AM   Result Value Ref Range    WBC 16.4 (H) 4.1 - 11.1 K/uL    RBC 4.75 4.10 - 5.70 M/uL    HGB 14.7 12.1 - 17.0 g/dL    HCT 41.1 36.6 - 50.3 %    MCV 86.5 80.0 - 99.0 FL    MCH 30.9 26.0 - 34.0 PG    MCHC 35.8 30.0 - 36.5 g/dL    RDW 12.5 11.5 - 14.5 %    PLATELET 722 (H) 504 - 400 K/uL    NEUTROPHILS 89 (H) 32 - 75 %    LYMPHOCYTES 7 (L) 12 - 49 %    MONOCYTES 4 (L) 5 - 13 %    EOSINOPHILS 0 0 - 7 %    BASOPHILS 0 0 - 1 %    ABS. NEUTROPHILS 14.6 (H) 1.8 - 8.0 K/UL    ABS. LYMPHOCYTES 1.1 0.8 - 3.5 K/UL    ABS. MONOCYTES 0.7 0.0 - 1.0 K/UL    ABS. EOSINOPHILS 0.0 0.0 - 0.4 K/UL    ABS. BASOPHILS 0.0 0.0 - 0.1 K/UL   METABOLIC PANEL, COMPREHENSIVE    Collection Time: 01/16/18  4:37 AM   Result Value Ref Range    Sodium 140 136 - 145 mmol/L    Potassium 3.9 3.5 - 5.1 mmol/L    Chloride 102 97 - 108 mmol/L    CO2 26 21 - 32 mmol/L    Anion gap 12 5 - 15 mmol/L    Glucose 173 (H) 65 - 100 mg/dL    BUN 16 6 - 20 MG/DL    Creatinine 1.21 0.70 - 1.30 MG/DL    BUN/Creatinine ratio 13 12 - 20      GFR est AA >60 >60 ml/min/1.73m2    GFR est non-AA >60 >60 ml/min/1.73m2    Calcium 10.1 8.5 - 10.1 MG/DL    Bilirubin, total 0.6 0.2 - 1.0 MG/DL    ALT (SGPT) 23 12 - 78 U/L    AST (SGOT) 13 (L) 15 - 37 U/L    Alk.  phosphatase 70 45 - 117 U/L    Protein, total 8.0 6.4 - 8.2 g/dL    Albumin 4.6 3.5 - 5.0 g/dL    Globulin 3.4 2.0 - 4.0 g/dL    A-G Ratio 1.4 1.1 - 2.2     LIPASE    Collection Time: 01/16/18  4:37 AM   Result Value Ref Range    Lipase 85 73 - 393 U/L   URINE CULTURE HOLD SAMPLE    Collection Time: 01/16/18  6:25 AM   Result Value Ref Range    Urine culture hold URINE ON HOLD IN MICROBIOLOGY DEPT FOR 3 DAYS         Xr Abd Acute W 1 V Chest    Result Date: 1/16/2018  INDICATION:   abdominal pain EXAM:  ACUTE ABDOMINAL SERIES COMPARISON:  August 30, 2017 FINDINGS: Single view of the chest demonstrates a normal cardiomediastinal silhouette. The lungs are well expanded and clear. There is no free intraperitoneal air. Supine and upright/decubitus views of the abdomen demonstrate overall paucity of bowel gas. There are no abnormal calcifications. The osseous structures are unremarkable. IMPRESSION: No acute cardiopulmonary process. Nonspecific paucity of bowel gas. Nausea and Vomiting: Care Instructions  Your Care Instructions    When you are nauseated, you may feel weak and sweaty and notice a lot of saliva in your mouth. Nausea often leads to vomiting. Most of the time you do not need to worry about nausea and vomiting, but they can be signs of other illnesses. Two common causes of nausea and vomiting are stomach flu and food poisoning. Nausea and vomiting from viral stomach flu will usually start to improve within 24 hours. Nausea and vomiting from food poisoning may last from 12 to 48 hours. The doctor has checked you carefully, but problems can develop later. If you notice any problems or new symptoms, get medical treatment right away. Follow-up care is a key part of your treatment and safety. Be sure to make and go to all appointments, and call your doctor if you are having problems. It's also a good idea to know your test results and keep a list of the medicines you take. How can you care for yourself at home? · To prevent dehydration, drink plenty of fluids, enough so that your urine is light yellow or clear like water. Choose water and other caffeine-free clear liquids until you feel better. If you have kidney, heart, or liver disease and have to limit fluids, talk with your doctor before you increase the amount of fluids you drink. · Rest in bed until you feel better. · When you are able to eat, try clear soups, mild foods, and liquids until all symptoms are gone for 12 to 48 hours.  Other good choices include dry toast, crackers, cooked cereal, and gelatin dessert, such as Keyur-WADE. When should you call for help? Call 911 anytime you think you may need emergency care. For example, call if:  ? · You passed out (lost consciousness). ?Call your doctor now or seek immediate medical care if:  ? · You have symptoms of dehydration, such as:  ¨ Dry eyes and a dry mouth. ¨ Passing only a little dark urine. ¨ Feeling thirstier than usual.   ? · You have new or worsening belly pain. ? · You have a new or higher fever. ? · You vomit blood or what looks like coffee grounds. ? Watch closely for changes in your health, and be sure to contact your doctor if:  ? · You have ongoing nausea and vomiting. ? · Your vomiting is getting worse. ? · Your vomiting lasts longer than 2 days. ? · You are not getting better as expected. Where can you learn more? Go to http://isaac-zoraida.info/. Enter 25 [id number] in the search box to learn more about \"Nausea and Vomiting: Care Instructions. \"  Current as of: March 20, 2017  Content Version: 11.4  © 3106-0368 [employer] Care instructions adapted under license by [employer] (which disclaims liability or warranty for this information). If you have questions about a medical condition or this instruction, always ask your healthcare professional. [address] any warranty or liability for your use of this information. Abdominal Pain: Care Instructions  Your Care Instructions    Abdominal pain has many possible causes. Some aren't serious and get better on their own in a few days. Others need more testing and treatment. If your pain continues or gets worse, you need to be rechecked and may need more tests to find out what is wrong. You may need surgery to correct the problem. Don't ignore new symptoms, such as fever, nausea and vomiting, urination problems, pain that gets worse, and dizziness. These may be signs of a more serious problem.   Your doctor may have recommended a follow-up visit in the next 8 to 12 hours. If you are not getting better, you may need more tests or treatment. The doctor has checked you carefully, but problems can develop later. If you notice any problems or new symptoms, get medical treatment right away. Follow-up care is a key part of your treatment and safety. Be sure to make and go to all appointments, and call your doctor if you are having problems. It's also a good idea to know your test results and keep a list of the medicines you take. How can you care for yourself at home? · Rest until you feel better. · To prevent dehydration, drink plenty of fluids, enough so that your urine is light yellow or clear like water. Choose water and other caffeine-free clear liquids until you feel better. If you have kidney, heart, or liver disease and have to limit fluids, talk with your doctor before you increase the amount of fluids you drink. · If your stomach is upset, eat mild foods, such as rice, dry toast or crackers, bananas, and applesauce. Try eating several small meals instead of two or three large ones. · Wait until 48 hours after all symptoms have gone away before you have spicy foods, alcohol, and drinks that contain caffeine. · Do not eat foods that are high in fat. · Avoid anti-inflammatory medicines such as aspirin, ibuprofen (Advil, Motrin), and naproxen (Aleve). These can cause stomach upset. Talk to your doctor if you take daily aspirin for another health problem. When should you call for help? Call 911 anytime you think you may need emergency care. For example, call if:  ? · You passed out (lost consciousness). ? · You pass maroon or very bloody stools. ? · You vomit blood or what looks like coffee grounds. ? · You have new, severe belly pain. ?Call your doctor now or seek immediate medical care if:  ? · Your pain gets worse, especially if it becomes focused in one area of your belly.    ? · You have a new or higher fever.   ? · Your stools are black and look like tar, or they have streaks of blood. ? · You have unexpected vaginal bleeding. ? · You have symptoms of a urinary tract infection. These may include:  ¨ Pain when you urinate. ¨ Urinating more often than usual.  ¨ Blood in your urine. ? · You are dizzy or lightheaded, or you feel like you may faint. ? Watch closely for changes in your health, and be sure to contact your doctor if:  ? · You are not getting better after 1 day (24 hours). Where can you learn more? Go to http://isaac-zoraida.info/. Enter D708 in the search box to learn more about \"Abdominal Pain: Care Instructions. \"  Current as of: March 20, 2017  Content Version: 11.4  © 7856-1148 [employer]. Care instructions adapted under license by [employer] (which disclaims liability or warranty for this information). If you have questions about a medical condition or this instruction, always ask your healthcare professional. [place] [address] any warranty or liability for your use of this information. Learning About High White Blood Cell Counts  What are white blood cells? White blood cells (leukocytes) help protect your body from infection. Normally, when germs get inside your body, your body makes more white blood cells that search for and destroy the germs. Less often, there are medical problems where the body may make a lot more white blood cells than it needs. What happens when you have a high white blood cell count? Your white blood cell count may be high because your body is fighting an infection. But other things can cause it, such as some medicines, burns, an illness, or other health problems. When your doctor sees that your white blood cell count is high, he or she will try to find out why, and then treat the cause. What are the symptoms? A high white blood cell count alone doesn't cause any symptoms.  The symptoms you feel may come from the medical problem that your white blood cells are fighting. For example, if you have pneumonia, you may have a fever and trouble breathing. These are symptoms of pneumonia, not of a high white blood cell count. How is it treated? · Your doctor may do more tests to find the problem that's making your white blood cell count high. Once your doctor finds the problem, he or she may be able to treat it. · Part of your treatment may be telling your doctor if you feel worse. Watch your temperature, and call your doctor if your fever goes up and stays up. Follow-up care is a key part of your treatment and safety. Be sure to make and go to all appointments, and call your doctor if you are having problems. It's also a good idea to know your test results and keep a list of the medicines you take. Where can you learn more? Go to http://isaac-zoraida.info/. Enter I213 in the search box to learn more about \"Learning About High White Blood Cell Counts. \"  Current as of: October 14, 2016  Content Version: 11.4  © 2858-3472 [employer]. Care instructions adapted under license by [employer] (which disclaims liability or warranty for this information). If you have questions about a medical condition or this instruction, always ask your healthcare professional. [place] [address] any warranty or liability for your use of this information. Marijuana Use in Teens: Care Instructions  Your Care Instructions  During your exam, traces of marijuana were found in your body. The active chemical in marijuana is THC. THC usually can be found in urine for a few days after marijuana is used. If the use is heavy, THC may be found for weeks after the use has stopped. In the [place], marijuana laws vary widely. The drug is legal in some states for those over age 24.  So its use by teens is illegal. And marijuana possession is still a crime under federal law.  Most schools and employers have strict rules about drug use. Many do drug testing. A positive drug test might cause you to be expelled from school or keep you from getting into a school you want to attend. You might not be able to take part in school sports or clubs. Or it might cause you to lose a job or keep you from getting hired. Follow-up care is a key part of your treatment and safety. Be sure to make and go to all appointments, and call your doctor if you are having problems. It's also a good idea to know your test results and keep a list of the medicines you take. How can you care for yourself at home? · Think carefully about whether using marijuana is worth the risks. · Do not drive or operate heavy equipment while using marijuana. Using marijuana may affect your judgment and coordination. And it can increase your risk of being in a car crash. · Make sure you understand the laws in your area. · Know the policies of your school or your employer. When should you call for help? Watch closely for changes in your health, and contact your doctor if you think you have a problem with marijuana use. Where can you learn more? Go to http://isaac-zoraida.info/. Enter P293 in the search box to learn more about \"Marijuana Use in Teens: Care Instructions. \"  Current as of: November 3, 2016  Content Version: 11.4  © 1247-5413 [employer]. Care instructions adapted under license by [employer] (which disclaims liability or warranty for this information). If you have questions about a medical condition or this instruction, always ask your healthcare professional. [place] [address] any warranty or liability for your use of this information.

## 2018-01-16 NOTE — ED NOTES
Dr. Kumar Do has reviewed discharge instructions with patient and self including prescription(s) if applicable. Patient has received and verbalizes understanding of all instructions and has no further questions at this time. Patient exits ED via ambulatory accompanied by self (family waiting outside). Patient in no acute distress.

## 2019-02-21 ENCOUNTER — HOSPITAL ENCOUNTER (EMERGENCY)
Age: 29
Discharge: HOME | End: 2019-02-21
Attending: EMERGENCY MEDICINE
Payer: COMMERCIAL

## 2019-02-21 VITALS
HEART RATE: 87 BPM | SYSTOLIC BLOOD PRESSURE: 125 MMHG | HEIGHT: 68 IN | WEIGHT: 160 LBS | OXYGEN SATURATION: 95 % | DIASTOLIC BLOOD PRESSURE: 76 MMHG | TEMPERATURE: 98.4 F | RESPIRATION RATE: 14 BRPM | BODY MASS INDEX: 24.25 KG/M2

## 2019-02-21 DIAGNOSIS — J02.9 SORE THROAT: ICD-10-CM

## 2019-02-21 DIAGNOSIS — R68.89 FLU-LIKE SYMPTOMS: Primary | ICD-10-CM

## 2019-02-21 LAB
DEPRECATED S PYO AG THROAT QL EIA: NEGATIVE
FLUAV AG NPH QL IA: NEGATIVE
FLUBV AG NOSE QL IA: NEGATIVE

## 2019-02-21 PROCEDURE — 87070 CULTURE OTHR SPECIMN AEROBIC: CPT

## 2019-02-21 PROCEDURE — 99283 EMERGENCY DEPT VISIT LOW MDM: CPT

## 2019-02-21 PROCEDURE — 87880 STREP A ASSAY W/OPTIC: CPT

## 2019-02-21 PROCEDURE — 87804 INFLUENZA ASSAY W/OPTIC: CPT

## 2019-02-21 PROCEDURE — 74011250637 HC RX REV CODE- 250/637: Performed by: EMERGENCY MEDICINE

## 2019-02-21 RX ORDER — ONDANSETRON 4 MG/1
4 TABLET, ORALLY DISINTEGRATING ORAL
Qty: 10 TAB | Refills: 0 | Status: SHIPPED | OUTPATIENT
Start: 2019-02-21 | End: 2020-01-16

## 2019-02-21 RX ORDER — ONDANSETRON 4 MG/1
8 TABLET, ORALLY DISINTEGRATING ORAL
Status: COMPLETED | OUTPATIENT
Start: 2019-02-21 | End: 2019-02-21

## 2019-02-21 RX ADMIN — ONDANSETRON 8 MG: 4 TABLET, ORALLY DISINTEGRATING ORAL at 07:28

## 2019-02-21 NOTE — DISCHARGE INSTRUCTIONS

## 2019-02-21 NOTE — ED NOTES
.Verbal shift change report given to Marleni Cardona (oncoming nurse) by Lilia Elizalde (offgoing nurse). Report included the following information SBAR and Recent Results.

## 2019-02-21 NOTE — ED PROVIDER NOTES
25 y.o. male with past medical history significant for Gastric ulcer, Heart murmur of , and h/o drug abuse who presents from home via private vehicle with chief complaint of sore throat. Pt reports onset \"3 nights ago\" of sore throat accompanied by generalized body aches, HA, and since this morning, nausea, vomiting x1, and diarrhea x1. Pt reports onset of N/V/D began shortly after waking up. Pt reports he has been taking DayQuil, Nyquil, and TheraFlu for his symptoms. Pt also notes an occasional cough. Pt reports history of stomach ulcers. Pt denies any ill contacts. Pt denies any chest pain or abdominal pain. There are no other acute medical concerns at this time. Social hx: Current \"some day\" smoker; No EtOH use; Drug use (Marijuana) PCP: Michell Aguilera MD 
 
Note written by Lima Mata, as dictated by Mary Still MD 7:22 AM  
 
 
 
The history is provided by the patient. No  was used. Past Medical History:  
Diagnosis Date  Gastric ulcer   
 due to drug use  Heart murmur of   Hx of drug abuse  Ulcer Past Surgical History:  
Procedure Laterality Date  HX OTHER SURGICAL    
 infant heart murmur Family History:  
Problem Relation Age of Onset  Migraines Mother Social History Socioeconomic History  Marital status:  Spouse name: Not on file  Number of children: Not on file  Years of education: Not on file  Highest education level: Not on file Social Needs  Financial resource strain: Not on file  Food insecurity - worry: Not on file  Food insecurity - inability: Not on file  Transportation needs - medical: Not on file  Transportation needs - non-medical: Not on file Occupational History  Not on file Tobacco Use  Smoking status: Current Some Day Smoker  Smokeless tobacco: Never Used Substance and Sexual Activity  Alcohol use:  No  
 Alcohol/week: 0.0 oz  Drug use: Yes Types: Marijuana  Sexual activity: Yes  
  Partners: Female Other Topics Concern  Not on file Social History Narrative ** Merged History Encounter ** ALLERGIES: Patient has no known allergies. Review of Systems Constitutional: Negative for chills and fever. HENT: Positive for sore throat. Negative for rhinorrhea. Respiratory: Positive for cough. Negative for shortness of breath. Cardiovascular: Negative for chest pain. Gastrointestinal: Positive for diarrhea, nausea and vomiting. Negative for abdominal pain. Genitourinary: Negative for dysuria and hematuria. Musculoskeletal: Positive for myalgias (Generalized). Negative for arthralgias. Skin: Negative for pallor and rash. Neurological: Positive for headaches. Negative for dizziness, weakness and light-headedness. All other systems reviewed and are negative. Vitals:  
 02/21/19 2872 02/21/19 4320 BP: 129/77 124/80 Pulse: 79 Resp: 16 Temp: 98.5 °F (36.9 °C) SpO2: 97% Weight: 72.6 kg (160 lb) Height: 5' 8\" (1.727 m) Physical Exam  
Vital signs reviewed. Nursing notes reviewed. Const:  No acute distress, well developed, well nourished Head:  Atraumatic, normocephalic Eyes:  PERRL, conjunctiva normal, no scleral icterus Neck:  Supple, trachea midline, no nuchal rigidity noted Throat: Pt exhibits erythema of posterior oropharynx, uvula midline, no appreciable swelling noted Cardiovascular:  RRR, no murmurs, no gallops, no rubs Resp:  No resp distress, no increased work of breathing, no wheezes, no rhonchi, no rales, Abd:  Soft, non-tender, non-distended, no rebound, no guarding, no CVA tenderness :  Deferred MSK:  No pedal edema, normal ROM Neuro:  Alert and oriented x3, no cranial nerve defect Skin:  Warm, dry, intact Psych: normal mood and affect, behavior is normal, judgement and thought content is normal 
 
 Note written by Lima Sahu, as dictated by Jayson Mitchell MD 7:22 AM  
 
MDM Number of Diagnoses or Management Options Flu-like symptoms:  
Sore throat:  
  
Amount and/or Complexity of Data Reviewed Clinical lab tests: reviewed Review and summarize past medical records: yes Patient Progress Patient progress: stable Pt. Presents to the ER with flu-like sx. Pt. Is well appearing in the ER. Negative flu. Negative strep. Likely viral illness. Will start him on zofran. Pt. To f/u with his PCP or return to the ER with worsening sx. Procedures

## 2019-02-21 NOTE — LETTER
[address] 
[facility] EMERGENCY DEPT 
[address] [place] [address] [phone] Work/School Note Date: 2/21/2019 To Whom It May concern: 
 
Raymundo Escobedo was seen and treated today in the emergency room by the following provider(s): 
Attending Provider: Radha Arredondo MD. Raymundo Escobedo may return to work on 2/24/19.  
Sincerely, 
 
 
 
 
Ab Vasquez MD

## 2019-02-23 LAB
BACTERIA SPEC CULT: NORMAL
SERVICE CMNT-IMP: NORMAL

## 2020-01-16 ENCOUNTER — APPOINTMENT (OUTPATIENT)
Dept: CT IMAGING | Age: 30
End: 2020-01-16
Attending: STUDENT IN AN ORGANIZED HEALTH CARE EDUCATION/TRAINING PROGRAM
Payer: COMMERCIAL

## 2020-01-16 ENCOUNTER — HOSPITAL ENCOUNTER (EMERGENCY)
Age: 30
Discharge: HOME | End: 2020-01-16
Attending: STUDENT IN AN ORGANIZED HEALTH CARE EDUCATION/TRAINING PROGRAM
Payer: COMMERCIAL

## 2020-01-16 ENCOUNTER — APPOINTMENT (OUTPATIENT)
Dept: GENERAL RADIOLOGY | Age: 30
End: 2020-01-16
Attending: STUDENT IN AN ORGANIZED HEALTH CARE EDUCATION/TRAINING PROGRAM
Payer: COMMERCIAL

## 2020-01-16 VITALS
OXYGEN SATURATION: 98 % | HEIGHT: 67 IN | TEMPERATURE: 97.5 F | RESPIRATION RATE: 17 BRPM | DIASTOLIC BLOOD PRESSURE: 63 MMHG | BODY MASS INDEX: 22.76 KG/M2 | HEART RATE: 65 BPM | WEIGHT: 145 LBS | SYSTOLIC BLOOD PRESSURE: 131 MMHG

## 2020-01-16 DIAGNOSIS — R11.2 CANNABINOID HYPEREMESIS SYNDROME: Primary | ICD-10-CM

## 2020-01-16 DIAGNOSIS — F12.90 CANNABINOID HYPEREMESIS SYNDROME: Primary | ICD-10-CM

## 2020-01-16 LAB
ALBUMIN SERPL-MCNC: 4.3 G/DL (ref 3.5–5)
ALBUMIN/GLOB SERPL: 1.3 {RATIO} (ref 1.1–2.2)
ALP SERPL-CCNC: 72 U/L (ref 45–117)
ALT SERPL-CCNC: 29 U/L (ref 12–78)
AMPHET UR QL SCN: NEGATIVE
ANION GAP SERPL CALC-SCNC: 6 MMOL/L (ref 5–15)
APPEARANCE UR: CLEAR
AST SERPL-CCNC: 16 U/L (ref 15–37)
BACTERIA URNS QL MICRO: NEGATIVE /HPF
BARBITURATES UR QL SCN: NEGATIVE
BASOPHILS # BLD: 0.1 K/UL (ref 0–0.1)
BASOPHILS NFR BLD: 1 % (ref 0–1)
BENZODIAZ UR QL: NEGATIVE
BILIRUB SERPL-MCNC: 0.4 MG/DL (ref 0.2–1)
BILIRUB UR QL: NEGATIVE
BUN SERPL-MCNC: 18 MG/DL (ref 6–20)
BUN/CREAT SERPL: 14 (ref 12–20)
CALCIUM SERPL-MCNC: 9.6 MG/DL (ref 8.5–10.1)
CANNABINOIDS UR QL SCN: POSITIVE
CHLORIDE SERPL-SCNC: 106 MMOL/L (ref 97–108)
CO2 SERPL-SCNC: 28 MMOL/L (ref 21–32)
COCAINE UR QL SCN: NEGATIVE
COLOR UR: ABNORMAL
COMMENT, HOLDF: NORMAL
CREAT SERPL-MCNC: 1.3 MG/DL (ref 0.7–1.3)
DIFFERENTIAL METHOD BLD: ABNORMAL
DRUG SCRN COMMENT,DRGCM: ABNORMAL
EOSINOPHIL # BLD: 0.1 K/UL (ref 0–0.4)
EOSINOPHIL NFR BLD: 1 % (ref 0–7)
EPITH CASTS URNS QL MICRO: ABNORMAL /LPF
ERYTHROCYTE [DISTWIDTH] IN BLOOD BY AUTOMATED COUNT: 12.1 % (ref 11.5–14.5)
GLOBULIN SER CALC-MCNC: 3.2 G/DL (ref 2–4)
GLUCOSE SERPL-MCNC: 175 MG/DL (ref 65–100)
GLUCOSE UR STRIP.AUTO-MCNC: NEGATIVE MG/DL
HCT VFR BLD AUTO: 43.4 % (ref 36.6–50.3)
HGB BLD-MCNC: 15.2 G/DL (ref 12.1–17)
HGB UR QL STRIP: NEGATIVE
IMM GRANULOCYTES # BLD AUTO: 0.1 K/UL (ref 0–0.04)
IMM GRANULOCYTES NFR BLD AUTO: 1 % (ref 0–0.5)
KETONES UR QL STRIP.AUTO: 40 MG/DL
LEUKOCYTE ESTERASE UR QL STRIP.AUTO: NEGATIVE
LIPASE SERPL-CCNC: 142 U/L (ref 73–393)
LYMPHOCYTES # BLD: 1.5 K/UL (ref 0.8–3.5)
LYMPHOCYTES NFR BLD: 11 % (ref 12–49)
MCH RBC QN AUTO: 31.3 PG (ref 26–34)
MCHC RBC AUTO-ENTMCNC: 35 G/DL (ref 30–36.5)
MCV RBC AUTO: 89.5 FL (ref 80–99)
METHADONE UR QL: NEGATIVE
MONOCYTES # BLD: 0.7 K/UL (ref 0–1)
MONOCYTES NFR BLD: 5 % (ref 5–13)
NEUTS SEG # BLD: 11.3 K/UL (ref 1.8–8)
NEUTS SEG NFR BLD: 81 % (ref 32–75)
NITRITE UR QL STRIP.AUTO: NEGATIVE
NRBC # BLD: 0 K/UL (ref 0–0.01)
NRBC BLD-RTO: 0 PER 100 WBC
OPIATES UR QL: POSITIVE
PCP UR QL: NEGATIVE
PH UR STRIP: 8.5 [PH] (ref 5–8)
PLATELET # BLD AUTO: 346 K/UL (ref 150–400)
PMV BLD AUTO: 9.9 FL (ref 8.9–12.9)
POTASSIUM SERPL-SCNC: 3.9 MMOL/L (ref 3.5–5.1)
PROT SERPL-MCNC: 7.5 G/DL (ref 6.4–8.2)
PROT UR STRIP-MCNC: ABNORMAL MG/DL
RBC # BLD AUTO: 4.85 M/UL (ref 4.1–5.7)
RBC #/AREA URNS HPF: ABNORMAL /HPF (ref 0–5)
SAMPLES BEING HELD,HOLD: NORMAL
SODIUM SERPL-SCNC: 140 MMOL/L (ref 136–145)
SP GR UR REFRACTOMETRY: 1.02 (ref 1–1.03)
UR CULT HOLD, URHOLD: NORMAL
UROBILINOGEN UR QL STRIP.AUTO: 0.2 EU/DL (ref 0.2–1)
WBC # BLD AUTO: 13.7 K/UL (ref 4.1–11.1)
WBC URNS QL MICRO: ABNORMAL /HPF (ref 0–4)

## 2020-01-16 PROCEDURE — 85025 COMPLETE CBC W/AUTO DIFF WBC: CPT

## 2020-01-16 PROCEDURE — 96375 TX/PRO/DX INJ NEW DRUG ADDON: CPT

## 2020-01-16 PROCEDURE — 83690 ASSAY OF LIPASE: CPT

## 2020-01-16 PROCEDURE — 99284 EMERGENCY DEPT VISIT MOD MDM: CPT

## 2020-01-16 PROCEDURE — 74177 CT ABD & PELVIS W/CONTRAST: CPT

## 2020-01-16 PROCEDURE — 80307 DRUG TEST PRSMV CHEM ANLYZR: CPT

## 2020-01-16 PROCEDURE — 80053 COMPREHEN METABOLIC PANEL: CPT

## 2020-01-16 PROCEDURE — 96374 THER/PROPH/DIAG INJ IV PUSH: CPT

## 2020-01-16 PROCEDURE — 74011250636 HC RX REV CODE- 250/636: Performed by: STUDENT IN AN ORGANIZED HEALTH CARE EDUCATION/TRAINING PROGRAM

## 2020-01-16 PROCEDURE — 74011636320 HC RX REV CODE- 636/320: Performed by: RADIOLOGY

## 2020-01-16 PROCEDURE — 81001 URINALYSIS AUTO W/SCOPE: CPT

## 2020-01-16 PROCEDURE — 74022 RADEX COMPL AQT ABD SERIES: CPT

## 2020-01-16 PROCEDURE — 36415 COLL VENOUS BLD VENIPUNCTURE: CPT

## 2020-01-16 RX ORDER — METOCLOPRAMIDE HYDROCHLORIDE 5 MG/ML
10 INJECTION INTRAMUSCULAR; INTRAVENOUS
Status: COMPLETED | OUTPATIENT
Start: 2020-01-16 | End: 2020-01-16

## 2020-01-16 RX ORDER — OMEPRAZOLE 20 MG/1
20 CAPSULE, DELAYED RELEASE ORAL DAILY
Qty: 14 CAP | Refills: 0 | Status: SHIPPED | OUTPATIENT
Start: 2020-01-16 | End: 2020-01-30

## 2020-01-16 RX ORDER — SUCRALFATE 1 G/10ML
1 SUSPENSION ORAL 4 TIMES DAILY
Qty: 414 ML | Refills: 0 | Status: SHIPPED | OUTPATIENT
Start: 2020-01-16

## 2020-01-16 RX ORDER — MORPHINE SULFATE 4 MG/ML
4 INJECTION INTRAVENOUS ONCE
Status: COMPLETED | OUTPATIENT
Start: 2020-01-16 | End: 2020-01-16

## 2020-01-16 RX ORDER — ONDANSETRON 4 MG/1
4 TABLET, ORALLY DISINTEGRATING ORAL
Qty: 10 TAB | Refills: 0 | Status: SHIPPED | OUTPATIENT
Start: 2020-01-16

## 2020-01-16 RX ADMIN — SODIUM CHLORIDE 1000 ML: 900 INJECTION, SOLUTION INTRAVENOUS at 07:25

## 2020-01-16 RX ADMIN — IOPAMIDOL 100 ML: 755 INJECTION, SOLUTION INTRAVENOUS at 09:18

## 2020-01-16 RX ADMIN — METOCLOPRAMIDE 10 MG: 5 INJECTION, SOLUTION INTRAMUSCULAR; INTRAVENOUS at 07:25

## 2020-01-16 RX ADMIN — MORPHINE SULFATE 4 MG: 4 INJECTION INTRAVENOUS at 07:48

## 2020-01-16 NOTE — ED PROVIDER NOTES
30 y.o. male with past medical history significant for gastric ulcer, and drug abuse who presents via POV with chief complaint of vomiting. PT c/o nausea, vomiting, and non-radiating upper abdominal pain that began this AM around 03:00 (~4 hours ago). He has vomiting approximately 20 times and is now only dry heaving. He does not think there are any obvious triggers for his vomiting. He states he was fine last night when he went to bed and was woken from sleep with his sxs. He does endorse using marijuana yesterday. Pt notes a hx of the same sxs about a year ago. He has not yet taken any medication to treat his sxs,  He denies hx of abdominal surgery. Pt also denies fever, blood in emesis or stool, urinary sxs, and testicular pain. There are no other acute medical concerns at this time. Social hx: denies tobacco use; denies EtOH use; endorses marijuana use (last used yesterday)    PCP: Ivonne Aguilera MD      Note written by Lima Walter, as dictated by Jessica Tubbs MD 7:29 AM      The history is provided by the patient. No  was used.         Past Medical History:   Diagnosis Date    Gastric ulcer     due to drug use    Heart murmur of      Hx of drug abuse     Ulcer        Past Surgical History:   Procedure Laterality Date    HX OTHER SURGICAL      infant heart murmur         Family History:   Problem Relation Age of Onset    Migraines Mother        Social History     Socioeconomic History    Marital status:      Spouse name: Not on file    Number of children: Not on file    Years of education: Not on file    Highest education level: Not on file   Occupational History    Not on file   Social Needs    Financial resource strain: Not on file    Food insecurity:     Worry: Not on file     Inability: Not on file    Transportation needs:     Medical: Not on file     Non-medical: Not on file   Tobacco Use    Smoking status: Current Some Day Smoker  Smokeless tobacco: Never Used   Substance and Sexual Activity    Alcohol use: No     Alcohol/week: 0.0 standard drinks    Drug use: Yes     Types: Marijuana    Sexual activity: Yes     Partners: Female   Lifestyle    Physical activity:     Days per week: Not on file     Minutes per session: Not on file    Stress: Not on file   Relationships    Social connections:     Talks on phone: Not on file     Gets together: Not on file     Attends Moravian service: Not on file     Active member of club or organization: Not on file     Attends meetings of clubs or organizations: Not on file     Relationship status: Not on file    Intimate partner violence:     Fear of current or ex partner: Not on file     Emotionally abused: Not on file     Physically abused: Not on file     Forced sexual activity: Not on file   Other Topics Concern    Not on file   Social History Narrative    ** Merged History Encounter **              ALLERGIES: Patient has no known allergies. Review of Systems   Constitutional: Negative for chills and fever. Respiratory: Negative for cough and shortness of breath. Cardiovascular: Negative for chest pain. Gastrointestinal: Positive for abdominal pain, nausea and vomiting. Negative for blood in stool. Genitourinary: Negative for dysuria and testicular pain. Neurological: Negative for syncope. All other systems reviewed and are negative. Vitals:    01/16/20 0717   BP: 131/84   Pulse: 65   Resp: 17   Temp: 97.5 °F (36.4 °C)   SpO2: 100%   Weight: 65.8 kg (145 lb)   Height: 5' 7\" (1.702 m)            Physical Exam  Vitals signs and nursing note reviewed. Constitutional:       General: He is not in acute distress. Appearance: He is well-developed. HENT:      Head: Normocephalic and atraumatic. Eyes:      Conjunctiva/sclera: Conjunctivae normal.   Neck:      Musculoskeletal: Normal range of motion and neck supple.    Cardiovascular:      Rate and Rhythm: Normal rate and regular rhythm. Heart sounds: Normal heart sounds. Pulmonary:      Effort: Pulmonary effort is normal. No respiratory distress. Breath sounds: Normal breath sounds. Abdominal:      Palpations: Abdomen is soft. Tenderness: There is tenderness (mild) in the epigastric area. There is no guarding. Negative signs include McBurney's sign. Musculoskeletal: Normal range of motion. Skin:     General: Skin is warm and dry. Neurological:      Mental Status: He is alert and oriented to person, place, and time. Motor: No abnormal muscle tone. Note written by Lima Mcdonald, as dictated by Jez Reyes MD 7:34 AM      MDM       Procedures    The patient is resting comfortably and feels better, is alert and in no distress. The repeat examination is unremarkable and benign; in particular, there is no discomfort at McBurney's point. The history, exam, diagnostic testing, and current condition do not suggest acute appendicitis, bowel obstruction, incarcerated hernia, testicular torsion, acute cholecystitis, bowel perforation, major gastrointestinal bleeding, severe diverticulitis, sepsis, or other significant pathology to warrant further testing, continued ED treatment, admission, or surgical evaluation at this point. The vital signs have been stable and are within normal limits at this time. The patient does not have uncontrollable pain, intractable vomiting, or other significant symptoms. The patient's condition is stable and appropriate for discharge. The patient will pursue further outpatient evaluation with the primary care physician or other designated or consulting physician as indicated in the discharge instructions.

## 2020-01-16 NOTE — ED NOTES
MD Rajeev Tejada reviewed discharge instructions with the patient. The patient verbalized understanding. RX given to pt.

## 2020-01-16 NOTE — DISCHARGE INSTRUCTIONS
Patient Education        Marijuana Use: Care Instructions  Overview  During your exam, traces of marijuana were found in your body. The two most active chemicals in marijuana are THC and CBD. THC affects how you think, act, and feel. It can make you feel very happy or \"high. \" CBD can help you feel relaxed without the \"high. \" Marijuana products usually contain both THC and CBD. THC usually can be found in urine for a few days after marijuana is used. If you regularly use a lot of marijuana, THC may be found for weeks after use has stopped. There are many types, or strains, of marijuana. Each strain has specific THC-to-CBD ratios. Because of this, some strains have different kinds of effects than others. For example, if a strain of marijuana has a higher ratio of THC to CBD, it's more likely to affect your judgment, coordination, and decision making. In the [place], it's against federal law to possess, sell, give away, or grow marijuana for any purpose. But many states allow people with certain health problems to buy or grow it for their own use. And some states allow people to use it for recreational reasons. These laws vary from state to state. You can call your state department of health or health services to learn more about the laws in your state. If you live in a state where marijuana is legal, know your employer's policies about use. A positive drug test might cause you to lose your job. Or it might keep you from getting hired. If you use marijuana, take steps to lower your risk. Follow-up care is a key part of your treatment and safety. Be sure to make and go to all appointments, and call your doctor if you are having problems. It's also a good idea to know your test results and keep a list of the medicines you take. How can you care for yourself at home? · To have the lowest risk, don't use marijuana. But if you do use it, limit your use. · Know what you're using.  Choose products that have low levels of THC. The type (or strain), strength, and effects of marijuana can vary greatly. And understand how soon you may feel the effects of the product you use and how long those effects may last. The product label may have this information. · Don't drive or operate machinery after using marijuana. Using marijuana may affect your judgment, coordination, and decision making. · Don't smoke marijuana. The smoke can damage your lungs. If you do smoke it, don't breathe in deeply and don't hold your breath. · Don't use marijuana with alcohol, tobacco, or illegal drugs. · Reduce the risk of medicine interactions. Marijuana can be dangerous if you take it with blood thinners or with medicines that make you sleepy, control your mood, or lower your blood pressure. Talk to your doctor about other medicines you use before you try marijuana. · Keep others safe. Store marijuana in a safe and secure place. This is even more important with edible marijuana, which can be easily mistaken for treats or snacks. Make sure that children, friends, family, and pets can't get to it. And protect others from secondhand smoke. When should you call for help? Call your doctor now or seek immediate medical care if:    · You have new or worse symptoms of cannabis hyperemesis syndrome (CHS), such as:  ? Vomiting that doesn't stop. ? Not being able to keep down fluids. ? Belly pain. ? Symptoms that go away briefly when you take a hot bath or shower. This is one of the signs of CHS.     · You have symptoms of dehydration, such as:  ? Dry eyes and a dry mouth. ? Passing only a little dark urine. ? Feeling thirstier than usual.    Watch closely for changes in your health, and contact your doctor if:    · You think you have a problem with marijuana use. Where can you learn more? Go to http://isaac-zoraida.info/. Enter G661 in the search box to learn more about \"Marijuana Use: Care Instructions. \"  Current as of: February 5, 2019  Content Version: 12.2  © 2761-5899 [employer]. Care instructions adapted under license by [employer] (which disclaims liability or warranty for this information). If you have questions about a medical condition or this instruction, always ask your healthcare professional. [address] any warranty or liability for your use of this information.

## 2020-01-17 ENCOUNTER — HOSPITAL ENCOUNTER (EMERGENCY)
Age: 30
Discharge: HOME | End: 2020-01-17
Attending: EMERGENCY MEDICINE
Payer: COMMERCIAL

## 2020-01-17 VITALS
TEMPERATURE: 98.2 F | OXYGEN SATURATION: 96 % | HEART RATE: 68 BPM | RESPIRATION RATE: 16 BRPM | SYSTOLIC BLOOD PRESSURE: 105 MMHG | DIASTOLIC BLOOD PRESSURE: 47 MMHG

## 2020-01-17 DIAGNOSIS — K31.84 GASTROPARESIS: Primary | ICD-10-CM

## 2020-01-17 LAB
ALBUMIN SERPL-MCNC: 4.4 G/DL (ref 3.5–5)
ALBUMIN/GLOB SERPL: 1.4 {RATIO} (ref 1.1–2.2)
ALP SERPL-CCNC: 63 U/L (ref 45–117)
ALT SERPL-CCNC: 22 U/L (ref 12–78)
AMYLASE SERPL-CCNC: 111 U/L (ref 25–115)
ANION GAP SERPL CALC-SCNC: 9 MMOL/L (ref 5–15)
APPEARANCE UR: CLEAR
AST SERPL-CCNC: 13 U/L (ref 15–37)
BACTERIA URNS QL MICRO: NEGATIVE /HPF
BASOPHILS # BLD: 0 K/UL (ref 0–0.1)
BASOPHILS NFR BLD: 0 % (ref 0–1)
BILIRUB SERPL-MCNC: 0.7 MG/DL (ref 0.2–1)
BILIRUB UR QL: NEGATIVE
BUN SERPL-MCNC: 18 MG/DL (ref 6–20)
BUN/CREAT SERPL: 17 (ref 12–20)
CALCIUM SERPL-MCNC: 9.2 MG/DL (ref 8.5–10.1)
CHLORIDE SERPL-SCNC: 104 MMOL/L (ref 97–108)
CO2 SERPL-SCNC: 26 MMOL/L (ref 21–32)
COLOR UR: ABNORMAL
CREAT SERPL-MCNC: 1.08 MG/DL (ref 0.7–1.3)
DIFFERENTIAL METHOD BLD: ABNORMAL
EOSINOPHIL # BLD: 0 K/UL (ref 0–0.4)
EOSINOPHIL NFR BLD: 0 % (ref 0–7)
EPITH CASTS URNS QL MICRO: ABNORMAL /LPF
ERYTHROCYTE [DISTWIDTH] IN BLOOD BY AUTOMATED COUNT: 12.2 % (ref 11.5–14.5)
GLOBULIN SER CALC-MCNC: 3.1 G/DL (ref 2–4)
GLUCOSE SERPL-MCNC: 150 MG/DL (ref 65–100)
GLUCOSE UR STRIP.AUTO-MCNC: NEGATIVE MG/DL
HCT VFR BLD AUTO: 39.9 % (ref 36.6–50.3)
HGB BLD-MCNC: 14.1 G/DL (ref 12.1–17)
HGB UR QL STRIP: NEGATIVE
HYALINE CASTS URNS QL MICRO: ABNORMAL /LPF (ref 0–5)
IMM GRANULOCYTES # BLD AUTO: 0.1 K/UL (ref 0–0.04)
IMM GRANULOCYTES NFR BLD AUTO: 1 % (ref 0–0.5)
KETONES UR QL STRIP.AUTO: ABNORMAL MG/DL
LEUKOCYTE ESTERASE UR QL STRIP.AUTO: NEGATIVE
LYMPHOCYTES # BLD: 1.5 K/UL (ref 0.8–3.5)
LYMPHOCYTES NFR BLD: 8 % (ref 12–49)
MCH RBC QN AUTO: 31.3 PG (ref 26–34)
MCHC RBC AUTO-ENTMCNC: 35.3 G/DL (ref 30–36.5)
MCV RBC AUTO: 88.7 FL (ref 80–99)
MONOCYTES # BLD: 1.3 K/UL (ref 0–1)
MONOCYTES NFR BLD: 8 % (ref 5–13)
NEUTS SEG # BLD: 14.9 K/UL (ref 1.8–8)
NEUTS SEG NFR BLD: 83 % (ref 32–75)
NITRITE UR QL STRIP.AUTO: NEGATIVE
NRBC # BLD: 0 K/UL (ref 0–0.01)
NRBC BLD-RTO: 0 PER 100 WBC
PH UR STRIP: 6 [PH] (ref 5–8)
PLATELET # BLD AUTO: 338 K/UL (ref 150–400)
PMV BLD AUTO: 9.9 FL (ref 8.9–12.9)
POTASSIUM SERPL-SCNC: 3.3 MMOL/L (ref 3.5–5.1)
PROT SERPL-MCNC: 7.5 G/DL (ref 6.4–8.2)
PROT UR STRIP-MCNC: ABNORMAL MG/DL
RBC # BLD AUTO: 4.5 M/UL (ref 4.1–5.7)
RBC #/AREA URNS HPF: ABNORMAL /HPF (ref 0–5)
SODIUM SERPL-SCNC: 139 MMOL/L (ref 136–145)
SP GR UR REFRACTOMETRY: 1.02 (ref 1–1.03)
UA: UC IF INDICATED,UAUC: ABNORMAL
UROBILINOGEN UR QL STRIP.AUTO: 0.2 EU/DL (ref 0.2–1)
WBC # BLD AUTO: 17.9 K/UL (ref 4.1–11.1)
WBC URNS QL MICRO: ABNORMAL /HPF (ref 0–4)

## 2020-01-17 PROCEDURE — 96375 TX/PRO/DX INJ NEW DRUG ADDON: CPT

## 2020-01-17 PROCEDURE — 36415 COLL VENOUS BLD VENIPUNCTURE: CPT

## 2020-01-17 PROCEDURE — 99283 EMERGENCY DEPT VISIT LOW MDM: CPT

## 2020-01-17 PROCEDURE — 96374 THER/PROPH/DIAG INJ IV PUSH: CPT

## 2020-01-17 PROCEDURE — 85025 COMPLETE CBC W/AUTO DIFF WBC: CPT

## 2020-01-17 PROCEDURE — 96361 HYDRATE IV INFUSION ADD-ON: CPT

## 2020-01-17 PROCEDURE — 80053 COMPREHEN METABOLIC PANEL: CPT

## 2020-01-17 PROCEDURE — 82150 ASSAY OF AMYLASE: CPT

## 2020-01-17 PROCEDURE — 74011250636 HC RX REV CODE- 250/636: Performed by: EMERGENCY MEDICINE

## 2020-01-17 PROCEDURE — 81001 URINALYSIS AUTO W/SCOPE: CPT

## 2020-01-17 RX ORDER — ONDANSETRON 2 MG/ML
8 INJECTION INTRAMUSCULAR; INTRAVENOUS
Status: COMPLETED | OUTPATIENT
Start: 2020-01-17 | End: 2020-01-17

## 2020-01-17 RX ORDER — HALOPERIDOL 5 MG/ML
10 INJECTION INTRAMUSCULAR
Status: COMPLETED | OUTPATIENT
Start: 2020-01-17 | End: 2020-01-17

## 2020-01-17 RX ORDER — KETOROLAC TROMETHAMINE 30 MG/ML
30 INJECTION, SOLUTION INTRAMUSCULAR; INTRAVENOUS
Status: COMPLETED | OUTPATIENT
Start: 2020-01-17 | End: 2020-01-17

## 2020-01-17 RX ORDER — METOCLOPRAMIDE 10 MG/1
10 TABLET ORAL
Qty: 12 TAB | Refills: 0 | Status: SHIPPED | OUTPATIENT
Start: 2020-01-17 | End: 2020-01-27

## 2020-01-17 RX ORDER — DIPHENHYDRAMINE HYDROCHLORIDE 50 MG/ML
50 INJECTION, SOLUTION INTRAMUSCULAR; INTRAVENOUS
Status: COMPLETED | OUTPATIENT
Start: 2020-01-17 | End: 2020-01-17

## 2020-01-17 RX ADMIN — ONDANSETRON 8 MG: 2 INJECTION INTRAMUSCULAR; INTRAVENOUS at 04:08

## 2020-01-17 RX ADMIN — HALOPERIDOL LACTATE 10 MG: 5 INJECTION INTRAMUSCULAR at 04:20

## 2020-01-17 RX ADMIN — DIPHENHYDRAMINE HYDROCHLORIDE 50 MG: 50 INJECTION, SOLUTION INTRAMUSCULAR; INTRAVENOUS at 04:09

## 2020-01-17 RX ADMIN — SODIUM CHLORIDE 1000 ML: 900 INJECTION, SOLUTION INTRAVENOUS at 05:02

## 2020-01-17 RX ADMIN — KETOROLAC TROMETHAMINE 30 MG: 30 INJECTION, SOLUTION INTRAMUSCULAR at 04:11

## 2020-01-17 RX ADMIN — SODIUM CHLORIDE 1000 ML: 900 INJECTION, SOLUTION INTRAVENOUS at 04:07

## 2020-01-17 NOTE — DISCHARGE INSTRUCTIONS
Patient Education        Gastroparesis: Care Instructions  Your Care Instructions    When you have gastroparesis, your stomach takes a lot longer to empty. This delay can cause belly pain, bloating, and belching. It also can cause hiccups, heartburn, nausea or vomiting. You may not feel like eating. These symptoms may come and go. They most often occur during and after meals. You may feel full after only a few bites of food. This condition occurs when the nerves to the stomach don't work properly. Diabetes is the most common cause of this nerve damage. Gastroparesis can make it harder to control your blood sugar levels. But keeping your blood sugar levels under control may help with your symptoms. Parkinson's disease, stroke, and some medicines can also cause this condition. Home treatment can often help. Follow-up care is a key part of your treatment and safety. Be sure to make and go to all appointments, and call your doctor if you are having problems. It's also a good idea to know your test results and keep a list of the medicines you take. How can you care for yourself at home? · Eat several small meals each day rather than three large meals. · Eat foods that are low in fiber and fat. · If your doctor suggests it, take medicines that help the stomach empty more quickly. These are called motility agents. When should you call for help? Call your doctor now or seek immediate medical care if:    · You are vomiting.     · You have new or worse belly pain.     · You have a fever.     · You cannot pass stools or gas.    Watch closely for changes in your health, and be sure to contact your doctor if you have any problems. Where can you learn more? Go to http://isaac-zoraida.info/. Enter M106 in the search box to learn more about \"Gastroparesis: Care Instructions. \"  Current as of: November 7, 2018  Content Version: 12.2  © 8864-1947 [employer].  Care instructions adapted under license by [address] (which disclaims liability or warranty for this information). If you have questions about a medical condition or this instruction, always ask your healthcare professional. [address] any warranty or liability for your use of this information.

## 2020-01-17 NOTE — ED NOTES
I have reviewed discharge instructions with the patient. The patient verbalized understanding. The patein was able to ambulate to the exit with a steady gait and did not appear to be in any distress.

## 2020-01-17 NOTE — ED PROVIDER NOTES
47-year-old male with a past medical history significant for peptic ulcer disease, heart murmur, drug abuse, gastroparesis who presents to the ED with a complaint of intractable nausea, vomiting with epigastric abdominal pain that began 24 hours ago and has persisted throughout the day. He was seen and evaluated in the ER for the same symptoms during which time he was treated with IV fluid and antiemetic. The patient had a normal CT scan of the abdomen and pelvis. The patient states that he went home and felt better then symptoms returned this evening. Has been taking Zofran and Bentyl without any relief or discomfort. He denies any fever, sore throat, headache, chest pain or shortness of breath, diarrhea, constipation, dysuria, extremity weakness or numbness, dizziness, sick contact at home, prior abdominal surgery.            Past Medical History:   Diagnosis Date    Gastric ulcer     due to drug use    Heart murmur of      Hx of drug abuse ([facility] [address]     Ulcer        Past Surgical History:   Procedure Laterality Date    HX OTHER SURGICAL      infant heart murmur         Family History:   Problem Relation Age of Onset    Migraines Mother        Social History     Socioeconomic History    Marital status:      Spouse name: Not on file    Number of children: Not on file    Years of education: Not on file    Highest education level: Not on file   Occupational History    Not on file   Social Needs    Financial resource strain: Not on file    Food insecurity:     Worry: Not on file     Inability: Not on file    Transportation needs:     Medical: Not on file     Non-medical: Not on file   Tobacco Use    Smoking status: Current Some Day Smoker    Smokeless tobacco: Never Used   Substance and Sexual Activity    Alcohol use: No     Alcohol/week: 0.0 standard drinks    Drug use: Yes     Types: Marijuana    Sexual activity: Yes     Partners: Female   Lifestyle    Physical activity:     Days per week: Not on file     Minutes per session: Not on file    Stress: Not on file   Relationships    Social connections:     Talks on phone: Not on file     Gets together: Not on file     Attends Shinto service: Not on file     Active member of club or organization: Not on file     Attends meetings of clubs or organizations: Not on file     Relationship status: Not on file    Intimate partner violence:     Fear of current or ex partner: Not on file     Emotionally abused: Not on file     Physically abused: Not on file     Forced sexual activity: Not on file   Other Topics Concern    Not on file   Social History Narrative    ** Merged History Encounter **              ALLERGIES: Patient has no known allergies. Review of Systems   All other systems reviewed and are negative. Vitals:    01/17/20 0357   BP: 117/74   Pulse: 68   Resp: 16   Temp: 98.2 °F (36.8 °C)   SpO2: 95%            Physical Exam  Vitals signs and nursing note reviewed. CONSTITUTIONAL: Well-appearing; well-nourished; in no mild distress  HEAD: Normocephalic; atraumatic  EYES: PERRL; EOM intact; conjunctiva and sclera are clear bilaterally. ENT: No rhinorrhea; normal pharynx with no tonsillar hypertrophy; mucous membranes pink/moist, no erythema, no exudate. NECK: Supple; non-tender; no cervical lymphadenopathy  CARD: Normal S1, S2; no murmurs, rubs, or gallops. Regular rate and rhythm. RESP: Normal respiratory effort; breath sounds clear and equal bilaterally; no wheezes, rhonchi, or rales. ABD: Normal bowel sounds; non-distended; epigastric tenderness;  no palpable organomegaly, no masses, no bruits. Back Exam: Normal inspection; no vertebral point tenderness, no CVA tenderness. Normal range of motion. EXT: Normal ROM in all four extremities; non-tender to palpation; no swelling or deformity; distal pulses are normal, no edema. SKIN: Warm; dry; no rash.   NEURO:Alert and oriented x 3, coherent, ELKE-XII grossly intact, sensory and motor are non-focal.        MDM  Number of Diagnoses or Management Options  Diagnosis management comments: Assessment: 69-year-old male who presents to the ED with gastroparesis and epigastric discomfort with a negative work-up less than 12 hours ago. The patient also does have a history of marijuana abuse and chronic pain with narcotic dependency. He appears hemodynamically stable. Plan: Lab/ IVF/antiemetic and analgesia/serial exam/monitor and reevaluate/ Monitor and Reevaluate. Amount and/or Complexity of Data Reviewed  Clinical lab tests: ordered and reviewed  Tests in the radiology section of CPT®: ordered and reviewed  Tests in the medicine section of CPT®: reviewed and ordered  Discussion of test results with the performing providers: yes  Decide to obtain previous medical records or to obtain history from someone other than the patient: yes  Obtain history from someone other than the patient: yes  Review and summarize past medical records: yes  Discuss the patient with other providers: yes  Independent visualization of images, tracings, or specimens: yes    Risk of Complications, Morbidity, and/or Mortality  Presenting problems: moderate  Diagnostic procedures: moderate  Management options: moderate    Patient Progress  Patient progress: stable         Procedures    Progress Note:   Pt has been reexamined by Naeem Quintero MD. Pt is feeling much better. Symptoms have improved. All available results have been reviewed with pt and any available family. Pt understands sx, dx, and tx in ED. the patient was given p.o. challenge that he tolerated well. He denies any further discomfort. Care plan has been outlined and questions have been answered. Pt is ready to go home. Will send home on gastroparesis instruction. Prescription of Reglan. Outpatient referral with PCP as needed. Written by Naeem Quintero MD,5:13 AM    .   .

## 2020-01-17 NOTE — LETTER
[address] 
[facility] EMERGENCY DEPT 
[address] [facility] [place] [id number] [phone] Work/School Note Date: 1/17/2020 To Whom It May concern: 
 
Brooklyn Fan was seen and treated today in the emergency room by the following provider(s): 
No providers found. Brooklyn Fan may return to work on 01/19/2020. Sincerely, Cole Rowe MD

## 2020-03-04 NOTE — MR AVS SNAPSHOT
Patient remains in ccu overnight.  Remains alert and oriented.  Vitals stable.  Hydralazine given PRN for HTN.  Voiding via urinal without issues.     Visit Information Date & Time Provider Department Dept. Phone Encounter #  
 9/6/2017  2:20 PM Mercedes Francisco MD [address] [facility] [phone] [id number] Upcoming Health Maintenance Date Due Pneumococcal 19-64 Medium Risk (1 of 1 - PPSV23) 4/27/2007 DTaP/Tdap/Td series (1 - Tdap) 4/27/2009 INFLUENZA AGE 9 TO ADULT 8/1/2017 Allergies as of 9/6/2017  Review Complete On: 9/6/2017 By: Mercedes Francisco MD  
 No Known Allergies Current Immunizations  Never Reviewed No immunizations on file. Not reviewed this visit You Were Diagnosed With   
  
 Codes Comments Intractable vomiting with nausea, unspecified vomiting type    -  Primary ICD-10-CM: R11.2 ICD-9-CM: 326. 2 Vitals BP Pulse Temp Resp Height(growth percentile) Weight(growth percentile) 120/82 (BP 1 Location: Right arm, BP Patient Position: Sitting) 71 98.3 °F (36.8 °C) (Oral) 16 5' 7\" (1.702 m) 158 lb (71.7 kg) SpO2 BMI Smoking Status 98% 24.75 kg/m2 Current Some Day Smoker Vitals History BMI and BSA Data Body Mass Index Body Surface Area 24.75 kg/m 2 1.84 m 2 Preferred Pharmacy Pharmacy Name Phone [facility] [address] [place] [address] [phone] Your Updated Medication List  
  
   
This list is accurate as of: 9/6/17  3:21 PM.  Always use your most recent med list.  
  
  
  
  
 omeprazole 20 mg capsule Commonly known as:  PRILOSEC Take 1 Cap by mouth daily. ondansetron 4 mg disintegrating tablet Commonly known as:  ZOFRAN ODT Take 1 Tab by mouth every eight (8) hours as needed for Nausea. promethazine 25 mg tablet Commonly known as:  PHENERGAN Take 1 Tab by mouth every six (6) hours as needed. sucralfate 100 mg/mL suspension Commonly known as:  Zenovia Holts Take 5 mL by mouth four (4) times daily. Introducing [facility]! Dear Valeriano Garcia: Thank you for requesting a Savored account. Our records indicate that you already have an active Savored account. You can access your account anytime at [email or web address] Did you know that you can access your hospital and ER discharge instructions at any time in Savored? You can also review all of your test results from your hospital stay or ER visit. Additional Information If you have questions, please visit the Frequently Asked Questions section of the Savored website at [email or web address] Remember, Savored is NOT to be used for urgent needs. For medical emergencies, dial 911. Now available from your iPhone and Android! Please provide this summary of care documentation to your next provider. Your primary care clinician is listed as Caprice Aguilera. If you have any questions after today's visit, please call [phone].

## 2022-03-18 PROBLEM — K52.9 COLITIS: Status: ACTIVE | Noted: 2017-01-18

## 2022-03-19 PROBLEM — R11.2 INTRACTABLE NAUSEA AND VOMITING: Status: ACTIVE | Noted: 2017-09-01

## 2025-05-25 ENCOUNTER — HOSPITAL ENCOUNTER (EMERGENCY)
Facility: HOSPITAL | Age: 35
Discharge: HOME | End: 2025-05-25
Attending: EMERGENCY MEDICINE
Payer: COMMERCIAL

## 2025-05-25 ENCOUNTER — APPOINTMENT (OUTPATIENT)
Facility: HOSPITAL | Age: 35
End: 2025-05-25
Payer: COMMERCIAL

## 2025-05-25 VITALS
WEIGHT: 155 LBS | HEART RATE: 65 BPM | OXYGEN SATURATION: 99 % | DIASTOLIC BLOOD PRESSURE: 72 MMHG | TEMPERATURE: 97.6 F | RESPIRATION RATE: 13 BRPM | HEIGHT: 68 IN | SYSTOLIC BLOOD PRESSURE: 124 MMHG | BODY MASS INDEX: 23.49 KG/M2

## 2025-05-25 DIAGNOSIS — F12.188 CANNABIS HYPEREMESIS SYNDROME CONCURRENT WITH AND DUE TO CANNABIS ABUSE (HCC): ICD-10-CM

## 2025-05-25 DIAGNOSIS — R11.2 NAUSEA AND VOMITING, UNSPECIFIED VOMITING TYPE: Primary | ICD-10-CM

## 2025-05-25 LAB
ALBUMIN SERPL-MCNC: 4.9 G/DL (ref 3.5–5)
ALBUMIN/GLOB SERPL: 1.4 (ref 1.1–2.2)
ALP SERPL-CCNC: 81 U/L (ref 45–117)
ALT SERPL-CCNC: 26 U/L (ref 12–78)
ANION GAP SERPL CALC-SCNC: 13 MMOL/L (ref 2–12)
AST SERPL-CCNC: 21 U/L (ref 15–37)
BASOPHILS # BLD: 0.05 K/UL (ref 0–0.1)
BASOPHILS NFR BLD: 0.3 % (ref 0–1)
BILIRUB SERPL-MCNC: 1.2 MG/DL (ref 0.2–1)
BUN SERPL-MCNC: 21 MG/DL (ref 6–20)
BUN/CREAT SERPL: 14 (ref 12–20)
CALCIUM SERPL-MCNC: 10.7 MG/DL (ref 8.5–10.1)
CHLORIDE SERPL-SCNC: 108 MMOL/L (ref 97–108)
CO2 SERPL-SCNC: 18 MMOL/L (ref 21–32)
COMMENT:: NORMAL
CREAT SERPL-MCNC: 1.55 MG/DL (ref 0.7–1.3)
DIFFERENTIAL METHOD BLD: ABNORMAL
EOSINOPHIL # BLD: 0.02 K/UL (ref 0–0.4)
EOSINOPHIL NFR BLD: 0.1 % (ref 0–7)
ERYTHROCYTE [DISTWIDTH] IN BLOOD BY AUTOMATED COUNT: 12.3 % (ref 11.5–14.5)
GLOBULIN SER CALC-MCNC: 3.5 G/DL (ref 2–4)
GLUCOSE SERPL-MCNC: 126 MG/DL (ref 65–100)
HCT VFR BLD AUTO: 40 % (ref 36.6–50.3)
HGB BLD-MCNC: 14.2 G/DL (ref 12.1–17)
IMM GRANULOCYTES # BLD AUTO: 0.08 K/UL (ref 0–0.04)
IMM GRANULOCYTES NFR BLD AUTO: 0.5 % (ref 0–0.5)
LIPASE SERPL-CCNC: 26 U/L (ref 13–75)
LYMPHOCYTES # BLD: 0.64 K/UL (ref 0.8–3.5)
LYMPHOCYTES NFR BLD: 4.2 % (ref 12–49)
MCH RBC QN AUTO: 31.5 PG (ref 26–34)
MCHC RBC AUTO-ENTMCNC: 35.5 G/DL (ref 30–36.5)
MCV RBC AUTO: 88.7 FL (ref 80–99)
MONOCYTES # BLD: 0.49 K/UL (ref 0–1)
MONOCYTES NFR BLD: 3.2 % (ref 5–13)
NEUTS SEG # BLD: 13.94 K/UL (ref 1.8–8)
NEUTS SEG NFR BLD: 91.7 % (ref 32–75)
NRBC # BLD: 0 K/UL (ref 0–0.01)
NRBC BLD-RTO: 0 PER 100 WBC
PLATELET # BLD AUTO: 430 K/UL (ref 150–400)
PMV BLD AUTO: 9.7 FL (ref 8.9–12.9)
POTASSIUM SERPL-SCNC: 3.8 MMOL/L (ref 3.5–5.1)
PROT SERPL-MCNC: 8.4 G/DL (ref 6.4–8.2)
RBC # BLD AUTO: 4.51 M/UL (ref 4.1–5.7)
RBC MORPH BLD: ABNORMAL
SODIUM SERPL-SCNC: 139 MMOL/L (ref 136–145)
SPECIMEN HOLD: NORMAL
WBC # BLD AUTO: 15.2 K/UL (ref 4.1–11.1)

## 2025-05-25 PROCEDURE — 96374 THER/PROPH/DIAG INJ IV PUSH: CPT

## 2025-05-25 PROCEDURE — 80053 COMPREHEN METABOLIC PANEL: CPT

## 2025-05-25 PROCEDURE — 96361 HYDRATE IV INFUSION ADD-ON: CPT

## 2025-05-25 PROCEDURE — 85025 COMPLETE CBC W/AUTO DIFF WBC: CPT

## 2025-05-25 PROCEDURE — 36415 COLL VENOUS BLD VENIPUNCTURE: CPT

## 2025-05-25 PROCEDURE — 83690 ASSAY OF LIPASE: CPT

## 2025-05-25 PROCEDURE — 6360000004 HC RX CONTRAST MEDICATION: Performed by: RADIOLOGY

## 2025-05-25 PROCEDURE — 94761 N-INVAS EAR/PLS OXIMETRY MLT: CPT

## 2025-05-25 PROCEDURE — 74177 CT ABD & PELVIS W/CONTRAST: CPT

## 2025-05-25 PROCEDURE — 99285 EMERGENCY DEPT VISIT HI MDM: CPT

## 2025-05-25 PROCEDURE — 2580000003 HC RX 258

## 2025-05-25 PROCEDURE — 6360000002 HC RX W HCPCS

## 2025-05-25 RX ORDER — 0.9 % SODIUM CHLORIDE 0.9 %
1000 INTRAVENOUS SOLUTION INTRAVENOUS ONCE
Status: COMPLETED | OUTPATIENT
Start: 2025-05-25 | End: 2025-05-25

## 2025-05-25 RX ORDER — ONDANSETRON 4 MG/1
4 TABLET, FILM COATED ORAL DAILY PRN
Qty: 30 TABLET | Refills: 0 | Status: SHIPPED | OUTPATIENT
Start: 2025-05-25

## 2025-05-25 RX ORDER — ONDANSETRON 2 MG/ML
4 INJECTION INTRAMUSCULAR; INTRAVENOUS
Status: COMPLETED | OUTPATIENT
Start: 2025-05-25 | End: 2025-05-25

## 2025-05-25 RX ORDER — IOPAMIDOL 755 MG/ML
100 INJECTION, SOLUTION INTRAVASCULAR
Status: COMPLETED | OUTPATIENT
Start: 2025-05-25 | End: 2025-05-25

## 2025-05-25 RX ORDER — FAMOTIDINE 20 MG/1
20 TABLET, FILM COATED ORAL 2 TIMES DAILY
Qty: 60 TABLET | Refills: 0 | Status: SHIPPED | OUTPATIENT
Start: 2025-05-25

## 2025-05-25 RX ADMIN — SODIUM CHLORIDE 1000 ML: 0.9 INJECTION, SOLUTION INTRAVENOUS at 09:48

## 2025-05-25 RX ADMIN — IOPAMIDOL 100 ML: 755 INJECTION, SOLUTION INTRAVENOUS at 10:31

## 2025-05-25 RX ADMIN — ONDANSETRON 4 MG: 2 INJECTION, SOLUTION INTRAMUSCULAR; INTRAVENOUS at 09:58

## 2025-05-25 ASSESSMENT — PAIN - FUNCTIONAL ASSESSMENT: PAIN_FUNCTIONAL_ASSESSMENT: 0-10

## 2025-05-25 ASSESSMENT — PAIN SCALES - GENERAL: PAINLEVEL_OUTOF10: 9

## 2025-05-25 NOTE — ED TRIAGE NOTES
Pt arrives to the ER for complaints of vomiting that started yesterday at 1600 and reports mid abdominal pain that started yesterday.     Pt reports that he has also been having diarrhea.     Pt reports marijuana use 1-3 times a day.

## 2025-05-25 NOTE — DISCHARGE INSTRUCTIONS
We discussed your results today. It is important for you to follow up with your primary care for further evaluation and management.  You are prescribed medication to help with your nausea and vomiting that you may take as needed.  It is important for you to try to stay hydrated the best you can drinking plenty of fluids.  Return to the emergency department for worsening symptoms.

## 2025-05-25 NOTE — ED PROVIDER NOTES
[facility] EMERGENCY DEPARTMENT  EMERGENCY DEPARTMENT ENCOUNTER      Pt Name: Champ Freedman  MRN: [id number]  Birthdate 1990  Date of evaluation: 2025  Provider: Alirio Glaser PA-C    CHIEF COMPLAINT       Chief Complaint   Patient presents with    Abdominal Pain           Vomiting         HISTORY OF PRESENT ILLNESS   (Location/Symptom, Timing/Onset, Context/Setting, Quality, Duration, Modifying Factors, Severity)  Note limiting factors.   Champ Freedman is a 35 y.o. male who presents to the emergency department for vomiting, diarrhea and abdominal pain since yesterday. Patient localizes pain along the mid abdominal region. Denies fever, chills, CP, SOB, dysuria, or blood in the stool. Patient reports daily marijuana and tobacco smoking. Reports EtOH, one glass per week.       The history is provided by the patient.         Review of External Medical Records:     Nursing Notes were reviewed.    REVIEW OF SYSTEMS    (2-9 systems for level 4, 10 or more for level 5)     Review of Systems    Except as noted above the remainder of the review of systems was reviewed and negative.       PAST MEDICAL HISTORY     Past Medical History:   Diagnosis Date    Gastric ulcer     due to drug use    Heart murmur of      Hx of drug abuse (HCC)     Ulcer          SURGICAL HISTORY       Past Surgical History:   Procedure Laterality Date    OTHER SURGICAL HISTORY      infant heart murmur         CURRENT MEDICATIONS       Previous Medications    No medications on file       ALLERGIES     Patient has no known allergies.    FAMILY HISTORY       Family History   Problem Relation Age of Onset    Migraines Mother           SOCIAL HISTORY       Social History     Socioeconomic History    Marital status:    Tobacco Use    Smoking status: Some Days    Smokeless tobacco: Never   Substance and Sexual Activity    Alcohol use: No     Alcohol/week: 0.0 standard drinks of alcohol    Drug use: Yes     Types: